# Patient Record
Sex: FEMALE | Race: WHITE | NOT HISPANIC OR LATINO | Employment: OTHER | URBAN - METROPOLITAN AREA
[De-identification: names, ages, dates, MRNs, and addresses within clinical notes are randomized per-mention and may not be internally consistent; named-entity substitution may affect disease eponyms.]

---

## 2017-02-23 ENCOUNTER — HOSPITAL ENCOUNTER (OUTPATIENT)
Dept: SLEEP MEDICINE | Facility: HOSPITAL | Age: 32
Discharge: HOME | End: 2017-02-23
Attending: INTERNAL MEDICINE | Admitting: INTERNAL MEDICINE

## 2017-02-27 ENCOUNTER — HOSPITAL ENCOUNTER (OUTPATIENT)
Dept: SLEEP MEDICINE | Facility: HOSPITAL | Age: 32
Discharge: HOME | End: 2017-02-27
Attending: INTERNAL MEDICINE | Admitting: INTERNAL MEDICINE

## 2017-03-04 ENCOUNTER — HOSPITAL ENCOUNTER (OUTPATIENT)
Dept: RESPIRATORY THERAPY | Facility: HOSPITAL | Age: 32
Discharge: HOME | End: 2017-03-04
Attending: INTERNAL MEDICINE | Admitting: INTERNAL MEDICINE

## 2017-03-09 ENCOUNTER — HOSPITAL ENCOUNTER (OUTPATIENT)
Dept: SLEEP MEDICINE | Facility: HOSPITAL | Age: 32
Discharge: HOME | End: 2017-03-09
Attending: INTERNAL MEDICINE | Admitting: INTERNAL MEDICINE

## 2017-06-03 ENCOUNTER — HOSPITAL ENCOUNTER (OUTPATIENT)
Dept: URGENT CARE | Facility: CLINIC | Age: 32
Setting detail: SPECIMEN
Discharge: HOME | End: 2017-06-03
Attending: FAMILY MEDICINE | Admitting: FAMILY MEDICINE

## 2017-06-03 LAB
BACTERIA SPEC AEROBE CULT: NORMAL
Lab: NORMAL
MICRO REPORT STATUS: NORMAL
SPECIMEN SOURCE: NORMAL

## 2017-10-04 ENCOUNTER — HOSPITAL ENCOUNTER (OUTPATIENT)
Dept: ORTHOPEDIC SURGERY | Facility: CLINIC | Age: 32
Discharge: HOME | End: 2017-10-04
Attending: PHYSICIAN ASSISTANT | Admitting: PHYSICIAN ASSISTANT

## 2017-10-31 ENCOUNTER — HOSPITAL ENCOUNTER (OUTPATIENT)
Dept: PHYSICAL THERAPY | Facility: HOSPITAL | Age: 32
Setting detail: RECURRING SERIES
Discharge: HOME | End: 2018-01-30
Attending: PHYSICIAN ASSISTANT | Admitting: PHYSICIAN ASSISTANT

## 2017-12-23 ENCOUNTER — HOSPITAL ENCOUNTER (OUTPATIENT)
Dept: MRI IMAGING | Facility: HOSPITAL | Age: 32
Discharge: HOME | End: 2017-12-23
Attending: PHYSICIAN ASSISTANT | Admitting: PHYSICIAN ASSISTANT

## 2018-02-01 ENCOUNTER — HOSPITAL ENCOUNTER (OUTPATIENT)
Dept: PHYSICAL THERAPY | Facility: HOSPITAL | Age: 33
Setting detail: RECURRING SERIES
Discharge: HOME | End: 2018-02-08
Attending: PHYSICIAN ASSISTANT | Admitting: PHYSICIAN ASSISTANT

## 2018-02-16 ENCOUNTER — HOSPITAL ENCOUNTER (OUTPATIENT)
Dept: CT IMAGING | Facility: HOSPITAL | Age: 33
Discharge: HOME | End: 2018-02-16
Attending: PSYCHIATRY & NEUROLOGY | Admitting: PSYCHIATRY & NEUROLOGY

## 2018-03-29 ENCOUNTER — HOSPITAL ENCOUNTER (OUTPATIENT)
Dept: PAIN MEDICINE | Facility: HOSPITAL | Age: 33
Discharge: HOME | End: 2018-03-29
Attending: ANESTHESIOLOGY | Admitting: ANESTHESIOLOGY

## 2018-04-18 ENCOUNTER — HOSPITAL ENCOUNTER (OUTPATIENT)
Dept: PAIN MEDICINE | Facility: HOSPITAL | Age: 33
Discharge: HOME | End: 2018-04-18
Attending: ANESTHESIOLOGY | Admitting: ANESTHESIOLOGY

## 2018-04-25 ENCOUNTER — HOSPITAL ENCOUNTER (OUTPATIENT)
Dept: PAIN MEDICINE | Facility: HOSPITAL | Age: 33
Discharge: HOME | End: 2018-04-25
Attending: ANESTHESIOLOGY | Admitting: ANESTHESIOLOGY

## 2018-10-22 ENCOUNTER — HOSPITAL ENCOUNTER (OUTPATIENT)
Dept: MAMMOGRAPHY | Facility: HOSPITAL | Age: 33
Discharge: HOME | End: 2018-10-22
Attending: NURSE PRACTITIONER | Admitting: NURSE PRACTITIONER

## 2018-12-10 ENCOUNTER — HOSPITAL ENCOUNTER (OUTPATIENT)
Dept: PAIN MEDICINE | Facility: HOSPITAL | Age: 33
Discharge: HOME | End: 2018-12-10
Attending: ANESTHESIOLOGY | Admitting: ANESTHESIOLOGY

## 2019-01-17 ENCOUNTER — HOSPITAL ENCOUNTER (OUTPATIENT)
Dept: MRI IMAGING | Facility: HOSPITAL | Age: 34
Discharge: HOME | End: 2019-01-17
Attending: PHYSICIAN ASSISTANT | Admitting: PHYSICIAN ASSISTANT

## 2019-02-01 ENCOUNTER — HOSPITAL ENCOUNTER (OUTPATIENT)
Dept: PAIN MEDICINE | Facility: HOSPITAL | Age: 34
Discharge: HOME | End: 2019-02-01
Attending: ANESTHESIOLOGY | Admitting: ANESTHESIOLOGY

## 2019-02-22 ENCOUNTER — HOSPITAL ENCOUNTER (OUTPATIENT)
Dept: PAIN MEDICINE | Facility: HOSPITAL | Age: 34
Discharge: HOME | End: 2019-02-22
Attending: ANESTHESIOLOGY | Admitting: ANESTHESIOLOGY

## 2019-02-26 ENCOUNTER — HOSPITAL ENCOUNTER (OUTPATIENT)
Dept: GENERAL RADIOLOGY | Facility: HOSPITAL | Age: 34
Discharge: HOME | End: 2019-02-26
Attending: PHYSICIAN ASSISTANT | Admitting: PHYSICIAN ASSISTANT

## 2019-05-17 ENCOUNTER — HOSPITAL ENCOUNTER (OUTPATIENT)
Dept: MAMMOGRAPHY | Facility: HOSPITAL | Age: 34
Discharge: HOME | End: 2019-05-17
Attending: NURSE PRACTITIONER | Admitting: NURSE PRACTITIONER

## 2019-06-25 ENCOUNTER — OFFICE VISIT (OUTPATIENT)
Dept: ORTHOPEDIC SURGERY | Facility: CLINIC | Age: 34
End: 2019-06-25

## 2019-06-25 VITALS
SYSTOLIC BLOOD PRESSURE: 139 MMHG | BODY MASS INDEX: 22.2 KG/M2 | DIASTOLIC BLOOD PRESSURE: 78 MMHG | WEIGHT: 130 LBS | HEART RATE: 96 BPM | HEIGHT: 64 IN

## 2019-06-25 DIAGNOSIS — M50.10 CERVICAL RADICULOPATHY DUE TO INTERVERTEBRAL DISC DISORDER: Primary | ICD-10-CM

## 2019-06-25 PROCEDURE — 99213 OFFICE O/P EST LOW 20 MIN: CPT | Performed by: PHYSICIAN ASSISTANT

## 2019-06-25 RX ORDER — SUMATRIPTAN 50 MG/1
TABLET, FILM COATED ORAL
COMMUNITY
Start: 2018-03-29 | End: 2020-03-10 | Stop reason: ALTCHOICE

## 2019-06-25 RX ORDER — BUSPIRONE HYDROCHLORIDE 30 MG/1
TABLET ORAL EVERY 12 HOURS
COMMUNITY
Start: 2017-10-04 | End: 2019-12-10

## 2019-06-25 RX ORDER — LAMOTRIGINE 100 MG/1
200 TABLET ORAL EVERY 24 HOURS
COMMUNITY
Start: 2016-03-09 | End: 2020-03-10 | Stop reason: ALTCHOICE

## 2019-06-25 RX ORDER — AMLODIPINE BESYLATE 10 MG/1
TABLET ORAL
COMMUNITY
Start: 2016-03-09 | End: 2022-12-01

## 2019-06-25 RX ORDER — IBUPROFEN 800 MG/1
TABLET ORAL
COMMUNITY
Start: 2015-12-22 | End: 2020-02-25 | Stop reason: HOSPADM

## 2019-06-25 RX ORDER — DOCUSATE SODIUM 100 MG/1
1 CAPSULE, LIQUID FILLED ORAL EVERY 12 HOURS
COMMUNITY
Start: 2017-08-24

## 2019-06-25 RX ORDER — BUPRENORPHINE HYDROCHLORIDE AND NALOXONE HYDROCHLORIDE DIHYDRATE 8; 2 MG/1; MG/1
TABLET SUBLINGUAL
Refills: 0 | COMMUNITY
Start: 2019-06-21

## 2019-06-25 RX ORDER — MIRTAZAPINE 15 MG/1
15 TABLET, FILM COATED ORAL DAILY
COMMUNITY
End: 2019-12-10

## 2019-06-25 RX ORDER — LURASIDONE HYDROCHLORIDE 20 MG/1
20 TABLET, FILM COATED ORAL DAILY
COMMUNITY
End: 2019-12-10

## 2019-06-25 RX ORDER — DULOXETIN HYDROCHLORIDE 60 MG/1
CAPSULE, DELAYED RELEASE ORAL
COMMUNITY
Start: 2017-10-04

## 2019-06-25 RX ORDER — METHYLPHENIDATE HYDROCHLORIDE 36 MG/1
TABLET ORAL
Refills: 0 | COMMUNITY
Start: 2019-05-24 | End: 2019-12-10

## 2019-06-25 RX ORDER — RAMELTEON 8 MG/1
TABLET ORAL
COMMUNITY
Start: 2017-11-30 | End: 2022-12-01

## 2019-06-25 RX ORDER — AMLODIPINE BESYLATE AND BENAZEPRIL HYDROCHLORIDE 5; 10 MG/1; MG/1
1 CAPSULE ORAL DAILY
COMMUNITY
End: 2019-12-10

## 2019-06-25 RX ORDER — AMITRIPTYLINE HYDROCHLORIDE 10 MG/1
TABLET, FILM COATED ORAL EVERY 24 HOURS
COMMUNITY
Start: 2017-01-26 | End: 2022-12-01

## 2019-06-25 RX ORDER — DOXAZOSIN MESYLATE 4 MG/1
TABLET ORAL EVERY 24 HOURS
COMMUNITY
Start: 2018-03-29 | End: 2022-12-01

## 2019-06-27 NOTE — PROGRESS NOTES
Orthopedic Spine Follow Up    Referring Provider: No ref. provider found  Primary Care Provider: William Rao MD    Patient Name: Lynda Nolen  Patient Age: 33 y.o.  Chief Complaint: had concerns including Pain of the Lumbar Spine; Pain of the Cervical Spine; and Follow-up (injection x1 ins will not pay for anymore).    History of Present Illness: Lynda Nolen is a 33 y.o. year old female here in  Follow up today with chief complaint of had concerns including Pain of the Lumbar Spine; Pain of the Cervical Spine; and Follow-up (injection x1 ins will not pay for anymore)..Patient complains of significant neck pain, shooting pains in the arm(s), numbness of the arm(s) and loss of strength of the arm(s) for approximately 5 years.  Pain is described as constant, severe.  Pain is aggravated by extending head backwards, twisting to left side and twisting to right side, alleviated by inactivity, Cymbalta, amitriptyline. she has had previous herniated cervical disc.  Previous treatment includes cervical epidural injections which did not offer too much relief.  Patient denies  gait disturbance, loss of dexterity in her hands, weakness in hands, dropping objects, change in bowel and bowel bladder.  Associated with headache.      Imaging:  We have an MRI of cervical spine my interpretation is two-level disc degeneration significantly at the level of C5-6 with moderate central and foraminal narrowing in this area.    Assessment:   1. Cervical radiculopathy due to intervertebral disc disorder        Plan:  Patient has undergone multiple conservative treatments including cervical epidural injections without any relief.  She also takes Cymbalta and amitriptyline which only takes the edge off her pain.  She would like to proceed with surgical intervention if indicated and I have recommended being evaluated for cervical disc arthroplasty given her relative youth.  We need to evaluate her facet joints more  thoroughly, and get better quality imaging of her canal and neural foramen.  Plan will be to send her for a CT myelogram of cervical spine and have her follow-up afterwards to make a final decision regarding surgery.    Subjective     Review of Systems   Constitutional: Positive for activity change. Negative for fatigue and fever.   HENT: Negative for sore throat.    Eyes: Negative for double vision.   Respiratory: Negative for choking.    Gastrointestinal: Negative for abdominal pain and constipation.   Musculoskeletal: Positive for neck pain and neck stiffness. Negative for back pain and gait problem.   Neurological: Positive for headache. Negative for numbness.   Hematological: Does not bruise/bleed easily.   Psychiatric/Behavioral: Positive for sleep disturbance.       The following portions of the patient's history were reviewed and updated as appropriate: current medications, past medical history, past surgical history and problem list.    Objective     Physical Exam   Constitutional: She is oriented to person, place, and time. She appears well-developed.   HENT:   Head: Normocephalic.   Eyes: Conjunctivae are normal.   Neck: Spinous process tenderness and muscular tenderness present. Neck rigidity present. Decreased range of motion present.   Pulmonary/Chest: Effort normal.   Musculoskeletal: She exhibits tenderness.        Right hip: Normal.        Left hip: Normal.        Lumbar back: She exhibits decreased range of motion, tenderness and pain.   Neurological: She is oriented to person, place, and time. She has normal reflexes. She displays no atrophy. No cranial nerve deficit or sensory deficit.   Skin: Skin is warm.   Vitals reviewed.    Ortho Exam      1. Cervical radiculopathy due to intervertebral disc disorder         Orders Placed This Encounter   Procedures   • IR Myelogram Cervical Spine     Order Specific Question:   Reason for Exam:     Answer:   preoperative planning     Order Specific Question:    Patient Pregnant     Answer:   Unknown   • CT Cervical Spine With Intrathecal Contrast     Order Specific Question:   Patient Pregnant     Answer:   Unknown       Procedures

## 2019-07-03 ENCOUNTER — HOSPITAL ENCOUNTER (OUTPATIENT)
Dept: GENERAL RADIOLOGY | Facility: HOSPITAL | Age: 34
Discharge: HOME | End: 2019-07-03

## 2019-07-03 ENCOUNTER — APPOINTMENT (OUTPATIENT)
Dept: CT IMAGING | Facility: HOSPITAL | Age: 34
End: 2019-07-03

## 2019-08-02 ENCOUNTER — TELEPHONE (OUTPATIENT)
Dept: ORTHOPEDIC SURGERY | Facility: CLINIC | Age: 34
End: 2019-08-02

## 2019-08-07 DIAGNOSIS — M50.10 CERVICAL RADICULOPATHY DUE TO INTERVERTEBRAL DISC DISORDER: Primary | ICD-10-CM

## 2019-09-19 ENCOUNTER — TELEPHONE (OUTPATIENT)
Dept: ORTHOPEDIC SURGERY | Facility: CLINIC | Age: 34
End: 2019-09-19

## 2019-10-01 DIAGNOSIS — M50.10 CERVICAL RADICULOPATHY DUE TO INTERVERTEBRAL DISC DISORDER: Primary | ICD-10-CM

## 2019-10-02 RX ORDER — GABAPENTIN 800 MG/1
800 TABLET ORAL 4 TIMES DAILY
COMMUNITY

## 2019-10-08 RX ORDER — SODIUM CHLORIDE 0.9 % (FLUSH) 0.9 %
3-10 SYRINGE (ML) INJECTION AS NEEDED
Status: DISCONTINUED | OUTPATIENT
Start: 2019-10-08 | End: 2019-10-12 | Stop reason: HOSPADM

## 2019-10-08 RX ORDER — SODIUM CHLORIDE 0.9 % (FLUSH) 0.9 %
3 SYRINGE (ML) INJECTION EVERY 12 HOURS SCHEDULED
Status: DISCONTINUED | OUTPATIENT
Start: 2019-10-08 | End: 2019-10-12 | Stop reason: HOSPADM

## 2019-10-09 ENCOUNTER — HOSPITAL ENCOUNTER (OUTPATIENT)
Dept: GENERAL RADIOLOGY | Facility: HOSPITAL | Age: 34
Discharge: HOME | End: 2019-10-09

## 2019-10-09 ENCOUNTER — APPOINTMENT (OUTPATIENT)
Dept: CT IMAGING | Facility: HOSPITAL | Age: 34
End: 2019-10-09

## 2019-11-15 ENCOUNTER — TELEPHONE (OUTPATIENT)
Dept: ORTHOPEDIC SURGERY | Facility: CLINIC | Age: 34
End: 2019-11-15

## 2019-11-15 NOTE — TELEPHONE ENCOUNTER
Finally got the auth for CT Myelogram damian, I called [employer] in IR and left her a vm that it was approved and the order and the auth will be in pts chart and I asked that she call her and get this set up.    I also called the pt and left her a vm stating that it was approved and that she should be getting a call soon and if not she was to call me Monday to let me know

## 2019-11-26 RX ORDER — SODIUM CHLORIDE 0.9 % (FLUSH) 0.9 %
3 SYRINGE (ML) INJECTION EVERY 12 HOURS SCHEDULED
Status: DISCONTINUED | OUTPATIENT
Start: 2019-11-26 | End: 2019-11-27 | Stop reason: HOSPADM

## 2019-11-27 ENCOUNTER — HOSPITAL ENCOUNTER (OUTPATIENT)
Dept: CT IMAGING | Facility: HOSPITAL | Age: 34
Discharge: HOME | End: 2019-11-27

## 2019-11-27 ENCOUNTER — HOSPITAL ENCOUNTER (OUTPATIENT)
Dept: GENERAL RADIOLOGY | Facility: HOSPITAL | Age: 34
Discharge: HOME | End: 2019-11-27
Admitting: RADIOLOGY

## 2019-11-27 VITALS
HEART RATE: 74 BPM | HEIGHT: 62 IN | BODY MASS INDEX: 24.42 KG/M2 | OXYGEN SATURATION: 98 % | RESPIRATION RATE: 14 BRPM | DIASTOLIC BLOOD PRESSURE: 75 MMHG | WEIGHT: 132.72 LBS | SYSTOLIC BLOOD PRESSURE: 121 MMHG | TEMPERATURE: 99.1 F

## 2019-11-27 DIAGNOSIS — M50.10 CERVICAL RADICULOPATHY DUE TO INTERVERTEBRAL DISC DISORDER: ICD-10-CM

## 2019-11-27 LAB
APTT PPP: 27.3 SECONDS (ref 24–31)
B-HCG UR QL: NEGATIVE
BASOPHILS # BLD AUTO: 0 10*3/MM3 (ref 0–0.2)
BASOPHILS NFR BLD AUTO: 0.6 % (ref 0–1.5)
DEPRECATED RDW RBC AUTO: 41.6 FL (ref 37–54)
EOSINOPHIL # BLD AUTO: 0.1 10*3/MM3 (ref 0–0.4)
EOSINOPHIL NFR BLD AUTO: 1.9 % (ref 0.3–6.2)
ERYTHROCYTE [DISTWIDTH] IN BLOOD BY AUTOMATED COUNT: 12.9 % (ref 12.3–15.4)
HCT VFR BLD AUTO: 43.9 % (ref 34–46.6)
HGB BLD-MCNC: 15.1 G/DL (ref 12–15.9)
INR PPP: 1.01 (ref 0.9–1.1)
LYMPHOCYTES # BLD AUTO: 2.7 10*3/MM3 (ref 0.7–3.1)
LYMPHOCYTES NFR BLD AUTO: 34.2 % (ref 19.6–45.3)
MCH RBC QN AUTO: 31.5 PG (ref 26.6–33)
MCHC RBC AUTO-ENTMCNC: 34.3 G/DL (ref 31.5–35.7)
MCV RBC AUTO: 91.8 FL (ref 79–97)
MONOCYTES # BLD AUTO: 0.7 10*3/MM3 (ref 0.1–0.9)
MONOCYTES NFR BLD AUTO: 9.2 % (ref 5–12)
NEUTROPHILS # BLD AUTO: 4.3 10*3/MM3 (ref 1.7–7)
NEUTROPHILS NFR BLD AUTO: 54.1 % (ref 42.7–76)
NRBC BLD AUTO-RTO: 0.1 /100 WBC (ref 0–0.2)
PLATELET # BLD AUTO: 276 10*3/MM3 (ref 140–450)
PMV BLD AUTO: 8.5 FL (ref 6–12)
PROTHROMBIN TIME: 10.6 SECONDS (ref 9.6–11.7)
RBC # BLD AUTO: 4.78 10*6/MM3 (ref 3.77–5.28)
WBC NRBC COR # BLD: 7.9 10*3/MM3 (ref 3.4–10.8)

## 2019-11-27 PROCEDURE — 85730 THROMBOPLASTIN TIME PARTIAL: CPT | Performed by: RADIOLOGY

## 2019-11-27 PROCEDURE — 72126 CT NECK SPINE W/DYE: CPT

## 2019-11-27 PROCEDURE — 62302 MYELOGRAPHY LUMBAR INJECTION: CPT

## 2019-11-27 PROCEDURE — 85025 COMPLETE CBC W/AUTO DIFF WBC: CPT | Performed by: RADIOLOGY

## 2019-11-27 PROCEDURE — 25010000002 IOPAMIDOL 61 % SOLUTION: Performed by: PHYSICIAN ASSISTANT

## 2019-11-27 PROCEDURE — 81025 URINE PREGNANCY TEST: CPT | Performed by: RADIOLOGY

## 2019-11-27 PROCEDURE — 25010000003 LIDOCAINE 1 % SOLUTION: Performed by: PHYSICIAN ASSISTANT

## 2019-11-27 PROCEDURE — 72240 MYELOGRAPHY NECK SPINE: CPT

## 2019-11-27 PROCEDURE — 85610 PROTHROMBIN TIME: CPT | Performed by: RADIOLOGY

## 2019-11-27 RX ORDER — LIDOCAINE HYDROCHLORIDE 10 MG/ML
20 INJECTION, SOLUTION INFILTRATION; PERINEURAL
Status: COMPLETED | OUTPATIENT
Start: 2019-11-27 | End: 2019-11-27

## 2019-11-27 RX ADMIN — LIDOCAINE HYDROCHLORIDE 20 ML: 10 INJECTION, SOLUTION INFILTRATION; PERINEURAL at 13:00

## 2019-11-27 RX ADMIN — IOPAMIDOL 15 ML: 612 INJECTION, SOLUTION INTRATHECAL at 13:00

## 2019-11-27 NOTE — POST-PROCEDURE NOTE
IR POST OP NOTE    Procedure:C myelogram      Pre Op DX:Neck pain      Post Op DX:same      Anesthesia: Local      Findings:See dictation      Complications:No immediate.       Provider Signature: Dr. Richy Peterson

## 2019-11-27 NOTE — DISCHARGE INSTRUCTIONS
A responsible adult should stay with you and you should rest quietly for the rest of the day. Do not drink alcohol, drive or cook for 24 hours following your procedure.  Progress your diet as tolerated. Increase your fluid intake over the next 24 hours. Resume your usually medications including aspirin.  When you remove your dressing in 24 hours, a small amount of blood is to be expected. Do not be alarmed.  If you feel it is bleeding excessively apply pressure and proceed to the Emergency room.  Do not shower, bath, or get your dressing wet at all for 24 hours.  You may shower after the dressing is removed. Keep your head elevated at least 30 degrees for the net 24 hours.  No lifting more that 10 pounds for 24 hours.  If severe pain, increased shortness of air or racing heartbeat occur, seek immediate medical attention.  Follow up with Ted Mederos for results.

## 2019-12-10 ENCOUNTER — OFFICE VISIT (OUTPATIENT)
Dept: ORTHOPEDIC SURGERY | Facility: CLINIC | Age: 34
End: 2019-12-10

## 2019-12-10 VITALS
DIASTOLIC BLOOD PRESSURE: 86 MMHG | BODY MASS INDEX: 24.48 KG/M2 | HEIGHT: 62 IN | WEIGHT: 133 LBS | HEART RATE: 80 BPM | SYSTOLIC BLOOD PRESSURE: 136 MMHG

## 2019-12-10 DIAGNOSIS — M50.10 CERVICAL RADICULOPATHY DUE TO INTERVERTEBRAL DISC DISORDER: Primary | ICD-10-CM

## 2019-12-10 PROCEDURE — 99213 OFFICE O/P EST LOW 20 MIN: CPT | Performed by: PHYSICIAN ASSISTANT

## 2019-12-10 RX ORDER — METHYLPHENIDATE HYDROCHLORIDE 36 MG/1
TABLET, EXTENDED RELEASE ORAL
Refills: 0 | COMMUNITY
Start: 2019-10-11 | End: 2020-02-25 | Stop reason: HOSPADM

## 2019-12-10 NOTE — PROGRESS NOTES
Orthopedic Spine Follow Up    Referring Provider: No ref. provider found  Primary Care Provider: William Rao MD    Patient Name: Lynda Nolen  Patient Age: 34 y.o.  Chief Complaint: had concerns including Follow-up of the Cervical Spine (CT/Myelogram Cspine).    History of Present Illness: Lynda Nolen is a 34 y.o. year old female here in  Follow up today with chief complaint of had concerns including Follow-up of the Cervical Spine (CT/Myelogram Cspine)..Patient complains of significant neck pain, shooting pains in the arm(s), numbness of the arm(s) and loss of strength of the arm(s) for approximately 5 years.  Pain is described as constant, severe.  Pain is aggravated by extending head backwards, twisting to left side and twisting to right side, alleviated by inactivity, Cymbalta, amitriptyline. she has had previous herniated cervical disc.  Previous treatment includes cervical epidural injections which did not offer too much relief.  Patient denies  gait disturbance, loss of dexterity in her hands, weakness in hands, dropping objects, change in bowel and bowel bladder.  Associated with headache.  His headaches have gotten really bad.  She is sleeping a little bit better.  Her low back also continues to bother her.  She is status post 4-5 fusion.  She had a CT myelogram of lumbar spine which showed solid fusion with no significant abnormality of lumbar spine.  She has had an MRI of cervical spine which demonstrates cervical disc degeneration primarily at C5-6.  She states the problem is getting so bad she cannot do her everyday activities without severe discomfort.  She like to have this addressed surgically as all other treatment options have failed.  She was sent for a CT myelogram of cervical spine here today to review that.      Imaging:  My interpretation of cervical myelogram is C5-C7 disc degeneration with mild stenosis centrally and left foraminal narrowing at both levels worse at  C5-6.    Assessment:   1. Cervical radiculopathy due to intervertebral disc disorder        Plan:  Patient would like to have this addressed surgically.  I recommended a cervical artificial disc.  We will discuss the case with Dr. Singh to make a final decision.    Subjective     Review of Systems   Constitutional: Positive for activity change. Negative for fatigue and fever.   HENT: Negative for sore throat.    Eyes: Negative for double vision.   Respiratory: Negative for choking.    Gastrointestinal: Negative for abdominal pain and constipation.   Musculoskeletal: Positive for neck pain and neck stiffness. Negative for back pain and gait problem.   Neurological: Positive for headache. Negative for numbness.   Hematological: Does not bruise/bleed easily.   Psychiatric/Behavioral: Positive for sleep disturbance.       The following portions of the patient's history were reviewed and updated as appropriate: current medications, past medical history, past surgical history and problem list.    Objective     Physical Exam   Constitutional: She is oriented to person, place, and time. She appears well-developed.   HENT:   Head: Normocephalic.   Eyes: Conjunctivae are normal.   Neck: Spinous process tenderness and muscular tenderness present. Neck rigidity present. Decreased range of motion present.   Pulmonary/Chest: Effort normal.   Musculoskeletal: She exhibits tenderness.        Right hip: Normal.        Left hip: Normal.        Lumbar back: She exhibits decreased range of motion, tenderness and pain.   Neurological: She is oriented to person, place, and time. She has normal reflexes. She displays no atrophy. No cranial nerve deficit or sensory deficit.   Skin: Skin is warm.   Vitals reviewed.    Ortho Exam      1. Cervical radiculopathy due to intervertebral disc disorder         No orders of the defined types were placed in this encounter.      Procedures

## 2019-12-16 ENCOUNTER — APPOINTMENT (OUTPATIENT)
Dept: GENERAL RADIOLOGY | Facility: HOSPITAL | Age: 34
End: 2019-12-16

## 2019-12-16 ENCOUNTER — APPOINTMENT (OUTPATIENT)
Dept: CT IMAGING | Facility: HOSPITAL | Age: 34
End: 2019-12-16

## 2019-12-16 ENCOUNTER — HOSPITAL ENCOUNTER (EMERGENCY)
Facility: HOSPITAL | Age: 34
Discharge: HOME | End: 2019-12-16
Attending: EMERGENCY MEDICINE | Admitting: EMERGENCY MEDICINE

## 2019-12-16 VITALS
HEART RATE: 71 BPM | TEMPERATURE: 98.4 F | BODY MASS INDEX: 24.81 KG/M2 | OXYGEN SATURATION: 97 % | DIASTOLIC BLOOD PRESSURE: 75 MMHG | SYSTOLIC BLOOD PRESSURE: 132 MMHG | WEIGHT: 131.39 LBS | HEIGHT: 61 IN | RESPIRATION RATE: 14 BRPM

## 2019-12-16 DIAGNOSIS — S00.03XA CONTUSION OF SCALP, INITIAL ENCOUNTER: ICD-10-CM

## 2019-12-16 DIAGNOSIS — S13.9XXA NECK SPRAIN, INITIAL ENCOUNTER: ICD-10-CM
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
## 2019-12-16 DIAGNOSIS — S33.5XXA LUMBAR SPRAIN, INITIAL ENCOUNTER: ICD-10-CM

## 2019-12-16 PROCEDURE — 99283 EMERGENCY DEPT VISIT LOW MDM: CPT

## 2019-12-16 PROCEDURE — 72125 CT NECK SPINE W/O DYE: CPT

## 2019-12-16 PROCEDURE — 70450 CT HEAD/BRAIN W/O DYE: CPT

## 2019-12-16 PROCEDURE — 72110 X-RAY EXAM L-2 SPINE 4/>VWS: CPT
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
## 2019-12-16 NOTE — DISCHARGE INSTRUCTIONS
Follow-up primary care doctor 1 week.  Neurochecks every 4 hours return for vomiting mental status changes unequal pupils unable to answer questions appropriately or any other new or worse problems or concerns

## 2020-01-02 ENCOUNTER — TELEPHONE (OUTPATIENT)
Dept: NEUROSURGERY | Facility: CLINIC | Age: 35
End: 2020-01-02
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
## 2020-01-27 ENCOUNTER — OFFICE VISIT (OUTPATIENT)
Dept: NEUROSURGERY | Facility: CLINIC | Age: 35
End: 2020-01-27

## 2020-01-27 VITALS
WEIGHT: 136 LBS | SYSTOLIC BLOOD PRESSURE: 133 MMHG | HEART RATE: 99 BPM | BODY MASS INDEX: 25.68 KG/M2 | DIASTOLIC BLOOD PRESSURE: 65 MMHG | HEIGHT: 61 IN

## 2020-01-27 DIAGNOSIS — M50.10 CERVICAL RADICULOPATHY DUE TO INTERVERTEBRAL DISC DISORDER: Primary | ICD-10-CM

## 2020-01-27 PROCEDURE — 99203 OFFICE O/P NEW LOW 30 MIN: CPT | Performed by: NEUROLOGICAL SURGERY
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
## 2020-01-28 ENCOUNTER — PREP FOR SURGERY (OUTPATIENT)
Dept: OTHER | Facility: HOSPITAL | Age: 35
End: 2020-01-28

## 2020-01-28 DIAGNOSIS — M50.10 CERVICAL RADICULOPATHY DUE TO INTERVERTEBRAL DISC DISORDER: Primary | ICD-10-CM

## 2020-01-28 RX ORDER — SODIUM CHLORIDE 0.9 % (FLUSH) 0.9 %
3-10 SYRINGE (ML) INJECTION AS NEEDED
Status: CANCELLED | OUTPATIENT
Start: 2020-01-28

## 2020-01-28 RX ORDER — CEFAZOLIN SODIUM IN 0.9 % NACL 3 G/100 ML
3 INTRAVENOUS SOLUTION, PIGGYBACK (ML) INTRAVENOUS ONCE
Status: CANCELLED | OUTPATIENT
Start: 2020-01-28 | End: 2020-01-28

## 2020-01-28 RX ORDER — SODIUM CHLORIDE 0.9 % (FLUSH) 0.9 %
3 SYRINGE (ML) INJECTION EVERY 12 HOURS SCHEDULED
Status: CANCELLED | OUTPATIENT
Start: 2020-01-28

## 2020-02-20 ENCOUNTER — HOSPITAL ENCOUNTER (OUTPATIENT)
Dept: GENERAL RADIOLOGY | Facility: HOSPITAL | Age: 35
Discharge: HOME | End: 2020-02-20
Admitting: NEUROLOGICAL SURGERY

## 2020-02-20 ENCOUNTER — APPOINTMENT (OUTPATIENT)
Dept: PREADMISSION TESTING | Facility: HOSPITAL | Age: 35
End: 2020-02-20

## 2020-02-20 VITALS
DIASTOLIC BLOOD PRESSURE: 77 MMHG | SYSTOLIC BLOOD PRESSURE: 144 MMHG | WEIGHT: 135 LBS | HEART RATE: 93 BPM | HEIGHT: 62 IN | BODY MASS INDEX: 24.84 KG/M2 | OXYGEN SATURATION: 96 %

## 2020-02-20 DIAGNOSIS — M50.10 CERVICAL RADICULOPATHY DUE TO INTERVERTEBRAL DISC DISORDER: ICD-10-CM

## 2020-02-20 LAB
ABO GROUP BLD: NORMAL
ALBUMIN SERPL-MCNC: 4.8 G/DL (ref 3.5–5.2)
ALBUMIN/GLOB SERPL: 2.1 G/DL
ALP SERPL-CCNC: 64 U/L (ref 39–117)
ALT SERPL W P-5'-P-CCNC: 16 U/L (ref 1–33)
ANION GAP SERPL CALCULATED.3IONS-SCNC: 10 MMOL/L (ref 5–15)
APTT PPP: 26 SECONDS (ref 24–31)
AST SERPL-CCNC: 20 U/L (ref 1–32)
BASOPHILS # BLD AUTO: 0.1 10*3/MM3 (ref 0–0.2)
BASOPHILS NFR BLD AUTO: 0.5 % (ref 0–1.5)
BILIRUB SERPL-MCNC: 0.3 MG/DL (ref 0.2–1.2)
BILIRUB UR QL STRIP: NEGATIVE
BLD GP AB SCN SERPL QL: NEGATIVE
BUN BLD-MCNC: 7 MG/DL (ref 6–20)
BUN/CREAT SERPL: 10 (ref 7–25)
CALCIUM SPEC-SCNC: 10.3 MG/DL (ref 8.6–10.5)
CHLORIDE SERPL-SCNC: 100 MMOL/L (ref 98–107)
CLARITY UR: ABNORMAL
CO2 SERPL-SCNC: 31 MMOL/L (ref 22–29)
COLOR UR: YELLOW
CREAT BLD-MCNC: 0.7 MG/DL (ref 0.57–1)
DEPRECATED RDW RBC AUTO: 42.9 FL (ref 37–54)
EOSINOPHIL # BLD AUTO: 0.1 10*3/MM3 (ref 0–0.4)
EOSINOPHIL NFR BLD AUTO: 0.7 % (ref 0.3–6.2)
ERYTHROCYTE [DISTWIDTH] IN BLOOD BY AUTOMATED COUNT: 13.4 % (ref 12.3–15.4)
GFR SERPL CREATININE-BSD FRML MDRD: 95 ML/MIN/1.73
GLOBULIN UR ELPH-MCNC: 2.3 GM/DL
GLUCOSE BLD-MCNC: 111 MG/DL (ref 65–99)
GLUCOSE UR STRIP-MCNC: NEGATIVE MG/DL
HCT VFR BLD AUTO: 43.4 % (ref 34–46.6)
HGB BLD-MCNC: 14.8 G/DL (ref 12–15.9)
HGB UR QL STRIP.AUTO: NEGATIVE
INR PPP: 1.08 (ref 0.9–1.1)
KETONES UR QL STRIP: NEGATIVE
LEUKOCYTE ESTERASE UR QL STRIP.AUTO: NEGATIVE
LYMPHOCYTES # BLD AUTO: 1.3 10*3/MM3 (ref 0.7–3.1)
LYMPHOCYTES NFR BLD AUTO: 9.9 % (ref 19.6–45.3)
MCH RBC QN AUTO: 30.9 PG (ref 26.6–33)
MCHC RBC AUTO-ENTMCNC: 34.1 G/DL (ref 31.5–35.7)
MCV RBC AUTO: 90.4 FL (ref 79–97)
MONOCYTES # BLD AUTO: 0.6 10*3/MM3 (ref 0.1–0.9)
MONOCYTES NFR BLD AUTO: 4.9 % (ref 5–12)
MRSA DNA SPEC QL NAA+PROBE: NORMAL
NEUTROPHILS # BLD AUTO: 10.6 10*3/MM3 (ref 1.7–7)
NEUTROPHILS NFR BLD AUTO: 84 % (ref 42.7–76)
NITRITE UR QL STRIP: NEGATIVE
NRBC BLD AUTO-RTO: 0 /100 WBC (ref 0–0.2)
PH UR STRIP.AUTO: 7 [PH] (ref 5–8)
PLATELET # BLD AUTO: 259 10*3/MM3 (ref 140–450)
PMV BLD AUTO: 8 FL (ref 6–12)
POTASSIUM BLD-SCNC: 4 MMOL/L (ref 3.5–5.2)
PROT SERPL-MCNC: 7.1 G/DL (ref 6–8.5)
PROT UR QL STRIP: NEGATIVE
PROTHROMBIN TIME: 11.2 SECONDS (ref 9.6–11.7)
RBC # BLD AUTO: 4.8 10*6/MM3 (ref 3.77–5.28)
RH BLD: POSITIVE
SODIUM BLD-SCNC: 141 MMOL/L (ref 136–145)
SP GR UR STRIP: 1.01 (ref 1–1.03)
T&S EXPIRATION DATE: NORMAL
UROBILINOGEN UR QL STRIP: ABNORMAL
WBC NRBC COR # BLD: 12.7 10*3/MM3 (ref 3.4–10.8)

## 2020-02-20 PROCEDURE — 85730 THROMBOPLASTIN TIME PARTIAL: CPT | Performed by: NEUROLOGICAL SURGERY

## 2020-02-20 PROCEDURE — 71046 X-RAY EXAM CHEST 2 VIEWS: CPT

## 2020-02-20 PROCEDURE — 86900 BLOOD TYPING SEROLOGIC ABO: CPT

## 2020-02-20 PROCEDURE — 80053 COMPREHEN METABOLIC PANEL: CPT | Performed by: NEUROLOGICAL SURGERY

## 2020-02-20 PROCEDURE — 36415 COLL VENOUS BLD VENIPUNCTURE: CPT

## 2020-02-20 PROCEDURE — 86901 BLOOD TYPING SEROLOGIC RH(D): CPT

## 2020-02-20 PROCEDURE — 86900 BLOOD TYPING SEROLOGIC ABO: CPT | Performed by: NEUROLOGICAL SURGERY

## 2020-02-20 PROCEDURE — 86850 RBC ANTIBODY SCREEN: CPT | Performed by: NEUROLOGICAL SURGERY

## 2020-02-20 PROCEDURE — 86901 BLOOD TYPING SEROLOGIC RH(D): CPT | Performed by: NEUROLOGICAL SURGERY

## 2020-02-20 PROCEDURE — 85025 COMPLETE CBC W/AUTO DIFF WBC: CPT | Performed by: NEUROLOGICAL SURGERY

## 2020-02-20 PROCEDURE — 85610 PROTHROMBIN TIME: CPT | Performed by: NEUROLOGICAL SURGERY

## 2020-02-20 PROCEDURE — 81003 URINALYSIS AUTO W/O SCOPE: CPT | Performed by: NEUROLOGICAL SURGERY

## 2020-02-20 PROCEDURE — 87641 MR-STAPH DNA AMP PROBE: CPT | Performed by: NEUROLOGICAL SURGERY

## 2020-02-20 PROCEDURE — 93005 ELECTROCARDIOGRAM TRACING: CPT

## 2020-02-20 RX ORDER — METHOCARBAMOL 750 MG/1
750 TABLET, FILM COATED ORAL 2 TIMES DAILY
COMMUNITY
End: 2020-02-25 | Stop reason: HOSPADM

## 2020-02-20 RX ORDER — FAMOTIDINE 40 MG/1
40 TABLET, FILM COATED ORAL DAILY
COMMUNITY
End: 2022-12-01

## 2020-02-21 PROCEDURE — 93010 ELECTROCARDIOGRAM REPORT: CPT | Performed by: INTERNAL MEDICINE

## 2020-02-24 ENCOUNTER — ANESTHESIA EVENT (OUTPATIENT)
Dept: PERIOP | Facility: HOSPITAL | Age: 35
End: 2020-02-24

## 2020-02-25 ENCOUNTER — APPOINTMENT (OUTPATIENT)
Dept: GENERAL RADIOLOGY | Facility: HOSPITAL | Age: 35
End: 2020-02-25

## 2020-02-25 ENCOUNTER — HOSPITAL ENCOUNTER (OUTPATIENT)
Facility: HOSPITAL | Age: 35
Setting detail: HOSPITAL OUTPATIENT SURGERY
Discharge: HOME | End: 2020-02-25
Attending: NEUROLOGICAL SURGERY | Admitting: NEUROLOGICAL SURGERY

## 2020-02-25 ENCOUNTER — ANESTHESIA (OUTPATIENT)
Dept: PERIOP | Facility: HOSPITAL | Age: 35
End: 2020-02-25

## 2020-02-25 VITALS
RESPIRATION RATE: 20 BRPM | WEIGHT: 135.8 LBS | SYSTOLIC BLOOD PRESSURE: 121 MMHG | TEMPERATURE: 99.2 F | DIASTOLIC BLOOD PRESSURE: 72 MMHG | HEIGHT: 61 IN | HEART RATE: 71 BPM | OXYGEN SATURATION: 95 % | BODY MASS INDEX: 25.64 KG/M2

## 2020-02-25 DIAGNOSIS — M50.10 CERVICAL RADICULOPATHY DUE TO INTERVERTEBRAL DISC DISORDER: ICD-10-CM

## 2020-02-25 PROBLEM — M53.3 SACROILIAC JOINT PAIN: Status: ACTIVE | Noted: 2019-01-30

## 2020-02-25 PROBLEM — K21.9 GASTROESOPHAGEAL REFLUX DISEASE: Status: ACTIVE | Noted: 2017-11-30

## 2020-02-25 PROBLEM — J06.9 UPPER RESPIRATORY TRACT INFECTION: Status: ACTIVE | Noted: 2017-06-03

## 2020-02-25 PROBLEM — G47.33 OBSTRUCTIVE SLEEP APNEA SYNDROME IN ADULT: Status: ACTIVE | Noted: 2017-11-30

## 2020-02-25 PROBLEM — M79.7 FIBROMYOSITIS: Status: ACTIVE | Noted: 2018-03-29

## 2020-02-25 PROBLEM — G43.909 MIGRAINES: Status: ACTIVE | Noted: 2017-10-04

## 2020-02-25 PROBLEM — M54.50 LOW BACK PAIN: Status: ACTIVE | Noted: 2017-10-04

## 2020-02-25 PROBLEM — R51.9 HEADACHE: Status: ACTIVE | Noted: 2017-11-30

## 2020-02-25 PROBLEM — J02.9 ACUTE PHARYNGITIS: Status: ACTIVE | Noted: 2017-06-03

## 2020-02-25 PROBLEM — M50.321 OTHER CERVICAL DISC DEGENERATION AT C4-C5 LEVEL: Status: ACTIVE | Noted: 2017-10-04

## 2020-02-25 PROBLEM — N83.209 OVARIAN CYST: Status: ACTIVE | Noted: 2020-02-25

## 2020-02-25 PROBLEM — F19.11 HISTORY OF SUBSTANCE ABUSE (HCC): Status: ACTIVE | Noted: 2017-10-04

## 2020-02-25 PROBLEM — M47.812 OSTEOARTHRITIS OF CERVICAL SPINE WITHOUT MYELOPATHY: Status: ACTIVE | Noted: 2018-03-29

## 2020-02-25 PROBLEM — G89.29 CHRONIC PAIN: Status: ACTIVE | Noted: 2018-03-29

## 2020-02-25 PROBLEM — Z83.3 FAMILY HISTORY OF DIABETES MELLITUS: Status: ACTIVE | Noted: 2020-02-25

## 2020-02-25 LAB — B-HCG UR QL: NEGATIVE

## 2020-02-25 PROCEDURE — C1713 ANCHOR/SCREW BN/BN,TIS/BN: HCPCS | Performed by: NEUROLOGICAL SURGERY

## 2020-02-25 PROCEDURE — 81025 URINE PREGNANCY TEST: CPT | Performed by: NEUROLOGICAL SURGERY

## 2020-02-25 PROCEDURE — 25010000002 HYDROMORPHONE PER 4 MG: Performed by: NURSE ANESTHETIST, CERTIFIED REGISTERED

## 2020-02-25 PROCEDURE — 22551 ARTHRD ANT NTRBDY CERVICAL: CPT | Performed by: NEUROLOGICAL SURGERY

## 2020-02-25 PROCEDURE — 25010000002 PROPOFOL 10 MG/ML EMULSION: Performed by: NURSE ANESTHETIST, CERTIFIED REGISTERED

## 2020-02-25 PROCEDURE — 25010000002 FENTANYL CITRATE (PF) 100 MCG/2ML SOLUTION: Performed by: NURSE ANESTHETIST, CERTIFIED REGISTERED

## 2020-02-25 PROCEDURE — 72020 X-RAY EXAM OF SPINE 1 VIEW: CPT

## 2020-02-25 PROCEDURE — 25010000002 KETOROLAC TROMETHAMINE PER 15 MG: Performed by: NEUROLOGICAL SURGERY

## 2020-02-25 PROCEDURE — 25010000002 LORAZEPAM PER 2 MG: Performed by: ANESTHESIOLOGY

## 2020-02-25 PROCEDURE — 22845 INSERT SPINE FIXATION DEVICE: CPT | Performed by: NEUROLOGICAL SURGERY

## 2020-02-25 PROCEDURE — 25010000002 ONDANSETRON PER 1 MG: Performed by: NURSE ANESTHETIST, CERTIFIED REGISTERED

## 2020-02-25 PROCEDURE — 25010000002 MIDAZOLAM PER 1 MG: Performed by: NURSE ANESTHETIST, CERTIFIED REGISTERED

## 2020-02-25 PROCEDURE — 22853 INSJ BIOMECHANICAL DEVICE: CPT | Performed by: NEUROLOGICAL SURGERY

## 2020-02-25 PROCEDURE — 25010000002 DEXAMETHASONE PER 1 MG: Performed by: NURSE ANESTHETIST, CERTIFIED REGISTERED

## 2020-02-25 DEVICE — PLATE 3001019 ZEVO 19MM 1 LVL
Type: IMPLANTABLE DEVICE | Site: SPINE CERVICAL | Status: FUNCTIONAL
Brand: ZEVO™ ANTERIOR CERVICAL PLATE SYSTEM

## 2020-02-25 DEVICE — ALLOGRFT SPNG OSTEOAMP COMPRESSIBLE SM TALL 10X10X16MM: Type: IMPLANTABLE DEVICE | Site: SPINE CERVICAL | Status: FUNCTIONAL

## 2020-02-25 DEVICE — INTERBODY FUSION DEVICE NANOLOCK SURFACE TECHNOLOGY 6 DEGREE SMALL 7MM
Type: IMPLANTABLE DEVICE | Site: SPINE CERVICAL | Status: FUNCTIONAL
Brand: ENDOSKELETON TC

## 2020-02-25 RX ORDER — PROMETHAZINE HYDROCHLORIDE 25 MG/ML
6.25 INJECTION, SOLUTION INTRAMUSCULAR; INTRAVENOUS ONCE AS NEEDED
Status: DISCONTINUED | OUTPATIENT
Start: 2020-02-25 | End: 2020-02-25 | Stop reason: HOSPADM

## 2020-02-25 RX ORDER — EPHEDRINE SULFATE 50 MG/ML
5 INJECTION, SOLUTION INTRAVENOUS ONCE AS NEEDED
Status: DISCONTINUED | OUTPATIENT
Start: 2020-02-25 | End: 2020-02-25 | Stop reason: HOSPADM

## 2020-02-25 RX ORDER — PROMETHAZINE HYDROCHLORIDE 25 MG/1
25 TABLET ORAL ONCE AS NEEDED
Status: DISCONTINUED | OUTPATIENT
Start: 2020-02-25 | End: 2020-02-25 | Stop reason: HOSPADM

## 2020-02-25 RX ORDER — ACETAMINOPHEN 325 MG/1
650 TABLET ORAL ONCE AS NEEDED
Status: DISCONTINUED | OUTPATIENT
Start: 2020-02-25 | End: 2020-02-25 | Stop reason: HOSPADM

## 2020-02-25 RX ORDER — PROMETHAZINE HYDROCHLORIDE 25 MG/ML
12.5 INJECTION, SOLUTION INTRAMUSCULAR; INTRAVENOUS ONCE AS NEEDED
Status: DISCONTINUED | OUTPATIENT
Start: 2020-02-25 | End: 2020-02-25 | Stop reason: HOSPADM

## 2020-02-25 RX ORDER — LABETALOL HYDROCHLORIDE 5 MG/ML
5 INJECTION, SOLUTION INTRAVENOUS
Status: DISCONTINUED | OUTPATIENT
Start: 2020-02-25 | End: 2020-02-25 | Stop reason: HOSPADM

## 2020-02-25 RX ORDER — SODIUM CHLORIDE 0.9 % (FLUSH) 0.9 %
3 SYRINGE (ML) INJECTION EVERY 12 HOURS SCHEDULED
Status: DISCONTINUED | OUTPATIENT
Start: 2020-02-25 | End: 2020-02-25 | Stop reason: HOSPADM

## 2020-02-25 RX ORDER — ONDANSETRON 2 MG/ML
INJECTION INTRAMUSCULAR; INTRAVENOUS AS NEEDED
Status: DISCONTINUED | OUTPATIENT
Start: 2020-02-25 | End: 2020-02-25 | Stop reason: SURG

## 2020-02-25 RX ORDER — ONDANSETRON 2 MG/ML
4 INJECTION INTRAMUSCULAR; INTRAVENOUS ONCE AS NEEDED
Status: DISCONTINUED | OUTPATIENT
Start: 2020-02-25 | End: 2020-02-25 | Stop reason: HOSPADM

## 2020-02-25 RX ORDER — PROMETHAZINE HYDROCHLORIDE 25 MG/1
25 SUPPOSITORY RECTAL ONCE AS NEEDED
Status: DISCONTINUED | OUTPATIENT
Start: 2020-02-25 | End: 2020-02-25 | Stop reason: HOSPADM

## 2020-02-25 RX ORDER — PROMETHAZINE HYDROCHLORIDE 25 MG/1
25 SUPPOSITORY RECTAL ONCE AS NEEDED
Status: DISCONTINUED | OUTPATIENT
Start: 2020-02-25 | End: 2020-02-25 | Stop reason: SDUPTHER

## 2020-02-25 RX ORDER — CEFAZOLIN SODIUM IN 0.9 % NACL 3 G/100 ML
3 INTRAVENOUS SOLUTION, PIGGYBACK (ML) INTRAVENOUS ONCE
Status: DISCONTINUED | OUTPATIENT
Start: 2020-02-25 | End: 2020-02-25 | Stop reason: SDUPTHER

## 2020-02-25 RX ORDER — TRAMADOL HYDROCHLORIDE 50 MG/1
50 TABLET ORAL EVERY 6 HOURS PRN
Qty: 60 TABLET | Refills: 0 | Status: SHIPPED | OUTPATIENT
Start: 2020-02-25 | End: 2020-03-10 | Stop reason: ALTCHOICE

## 2020-02-25 RX ORDER — SODIUM CHLORIDE, SODIUM LACTATE, POTASSIUM CHLORIDE, CALCIUM CHLORIDE 600; 310; 30; 20 MG/100ML; MG/100ML; MG/100ML; MG/100ML
9 INJECTION, SOLUTION INTRAVENOUS CONTINUOUS PRN
Status: DISCONTINUED | OUTPATIENT
Start: 2020-02-25 | End: 2020-02-25 | Stop reason: HOSPADM

## 2020-02-25 RX ORDER — LIDOCAINE HYDROCHLORIDE 40 MG/ML
SOLUTION TOPICAL AS NEEDED
Status: DISCONTINUED | OUTPATIENT
Start: 2020-02-25 | End: 2020-02-25 | Stop reason: SURG

## 2020-02-25 RX ORDER — ROCURONIUM BROMIDE 10 MG/ML
INJECTION, SOLUTION INTRAVENOUS AS NEEDED
Status: DISCONTINUED | OUTPATIENT
Start: 2020-02-25 | End: 2020-02-25 | Stop reason: SURG

## 2020-02-25 RX ORDER — NEOSTIGMINE METHYLSULFATE 5 MG/5 ML
SYRINGE (ML) INTRAVENOUS AS NEEDED
Status: DISCONTINUED | OUTPATIENT
Start: 2020-02-25 | End: 2020-02-25 | Stop reason: SURG

## 2020-02-25 RX ORDER — MIDAZOLAM HYDROCHLORIDE 1 MG/ML
INJECTION INTRAMUSCULAR; INTRAVENOUS AS NEEDED
Status: DISCONTINUED | OUTPATIENT
Start: 2020-02-25 | End: 2020-02-25 | Stop reason: SURG

## 2020-02-25 RX ORDER — KETAMINE HYDROCHLORIDE 10 MG/ML
INJECTION INTRAMUSCULAR; INTRAVENOUS AS NEEDED
Status: DISCONTINUED | OUTPATIENT
Start: 2020-02-25 | End: 2020-02-25 | Stop reason: SURG

## 2020-02-25 RX ORDER — DEXAMETHASONE SODIUM PHOSPHATE 4 MG/ML
INJECTION, SOLUTION INTRA-ARTICULAR; INTRALESIONAL; INTRAMUSCULAR; INTRAVENOUS; SOFT TISSUE AS NEEDED
Status: DISCONTINUED | OUTPATIENT
Start: 2020-02-25 | End: 2020-02-25 | Stop reason: SURG

## 2020-02-25 RX ORDER — SODIUM CHLORIDE 0.9 % (FLUSH) 0.9 %
10 SYRINGE (ML) INJECTION EVERY 12 HOURS SCHEDULED
Status: DISCONTINUED | OUTPATIENT
Start: 2020-02-25 | End: 2020-02-25 | Stop reason: HOSPADM

## 2020-02-25 RX ORDER — IPRATROPIUM BROMIDE AND ALBUTEROL SULFATE 2.5; .5 MG/3ML; MG/3ML
3 SOLUTION RESPIRATORY (INHALATION) ONCE AS NEEDED
Status: DISCONTINUED | OUTPATIENT
Start: 2020-02-25 | End: 2020-02-25 | Stop reason: HOSPADM

## 2020-02-25 RX ORDER — HYDROMORPHONE HCL 110MG/55ML
0.5 PATIENT CONTROLLED ANALGESIA SYRINGE INTRAVENOUS
Status: DISCONTINUED | OUTPATIENT
Start: 2020-02-25 | End: 2020-02-25 | Stop reason: HOSPADM

## 2020-02-25 RX ORDER — MEPERIDINE HYDROCHLORIDE 25 MG/ML
12.5 INJECTION INTRAMUSCULAR; INTRAVENOUS; SUBCUTANEOUS
Status: DISCONTINUED | OUTPATIENT
Start: 2020-02-25 | End: 2020-02-25 | Stop reason: HOSPADM

## 2020-02-25 RX ORDER — SODIUM CHLORIDE 0.9 % (FLUSH) 0.9 %
3-10 SYRINGE (ML) INJECTION AS NEEDED
Status: DISCONTINUED | OUTPATIENT
Start: 2020-02-25 | End: 2020-02-25 | Stop reason: HOSPADM

## 2020-02-25 RX ORDER — HYDROMORPHONE HCL 110MG/55ML
PATIENT CONTROLLED ANALGESIA SYRINGE INTRAVENOUS AS NEEDED
Status: DISCONTINUED | OUTPATIENT
Start: 2020-02-25 | End: 2020-02-25 | Stop reason: SURG

## 2020-02-25 RX ORDER — SODIUM CHLORIDE 0.9 % (FLUSH) 0.9 %
10 SYRINGE (ML) INJECTION AS NEEDED
Status: DISCONTINUED | OUTPATIENT
Start: 2020-02-25 | End: 2020-02-25 | Stop reason: HOSPADM

## 2020-02-25 RX ORDER — KETOROLAC TROMETHAMINE 30 MG/ML
30 INJECTION, SOLUTION INTRAMUSCULAR; INTRAVENOUS ONCE
Status: COMPLETED | OUTPATIENT
Start: 2020-02-25 | End: 2020-02-25

## 2020-02-25 RX ORDER — HYDRALAZINE HYDROCHLORIDE 20 MG/ML
5 INJECTION INTRAMUSCULAR; INTRAVENOUS
Status: DISCONTINUED | OUTPATIENT
Start: 2020-02-25 | End: 2020-02-25 | Stop reason: HOSPADM

## 2020-02-25 RX ORDER — FENTANYL CITRATE 50 UG/ML
INJECTION, SOLUTION INTRAMUSCULAR; INTRAVENOUS AS NEEDED
Status: DISCONTINUED | OUTPATIENT
Start: 2020-02-25 | End: 2020-02-25 | Stop reason: SURG

## 2020-02-25 RX ORDER — PHENYLEPHRINE HCL IN 0.9% NACL 0.5 MG/5ML
.5-3 SYRINGE (ML) INTRAVENOUS
Status: DISCONTINUED | OUTPATIENT
Start: 2020-02-25 | End: 2020-02-25 | Stop reason: HOSPADM

## 2020-02-25 RX ORDER — LIDOCAINE HYDROCHLORIDE 20 MG/ML
INJECTION, SOLUTION EPIDURAL; INFILTRATION; INTRACAUDAL; PERINEURAL AS NEEDED
Status: DISCONTINUED | OUTPATIENT
Start: 2020-02-25 | End: 2020-02-25 | Stop reason: SURG

## 2020-02-25 RX ORDER — ACETAMINOPHEN 650 MG/1
650 SUPPOSITORY RECTAL ONCE AS NEEDED
Status: DISCONTINUED | OUTPATIENT
Start: 2020-02-25 | End: 2020-02-25 | Stop reason: HOSPADM

## 2020-02-25 RX ORDER — PROPOFOL 10 MG/ML
VIAL (ML) INTRAVENOUS AS NEEDED
Status: DISCONTINUED | OUTPATIENT
Start: 2020-02-25 | End: 2020-02-25 | Stop reason: SURG

## 2020-02-25 RX ORDER — LORAZEPAM 2 MG/ML
0.5 INJECTION INTRAMUSCULAR ONCE
Status: COMPLETED | OUTPATIENT
Start: 2020-02-25 | End: 2020-02-25

## 2020-02-25 RX ORDER — PROMETHAZINE HYDROCHLORIDE 25 MG/1
25 TABLET ORAL ONCE AS NEEDED
Status: DISCONTINUED | OUTPATIENT
Start: 2020-02-25 | End: 2020-02-25 | Stop reason: SDUPTHER

## 2020-02-25 RX ORDER — GLYCOPYRROLATE 1 MG/5 ML
SYRINGE (ML) INTRAVENOUS AS NEEDED
Status: DISCONTINUED | OUTPATIENT
Start: 2020-02-25 | End: 2020-02-25 | Stop reason: SURG

## 2020-02-25 RX ADMIN — KETOROLAC TROMETHAMINE 30 MG: 30 INJECTION, SOLUTION INTRAMUSCULAR at 14:47

## 2020-02-25 RX ADMIN — MIDAZOLAM 2 MG: 1 INJECTION INTRAMUSCULAR; INTRAVENOUS at 11:07

## 2020-02-25 RX ADMIN — DEXAMETHASONE SODIUM PHOSPHATE 8 MG: 4 INJECTION, SOLUTION INTRAMUSCULAR; INTRAVENOUS at 11:30

## 2020-02-25 RX ADMIN — PROPOFOL 100 MG: 10 INJECTION, EMULSION INTRAVENOUS at 11:12

## 2020-02-25 RX ADMIN — Medication 3 MG: at 13:11

## 2020-02-25 RX ADMIN — ONDANSETRON 4 MG: 2 INJECTION INTRAMUSCULAR; INTRAVENOUS at 13:07

## 2020-02-25 RX ADMIN — SODIUM CHLORIDE, SODIUM LACTATE, POTASSIUM CHLORIDE, AND CALCIUM CHLORIDE 9 ML/HR: 600; 310; 30; 20 INJECTION, SOLUTION INTRAVENOUS at 08:40

## 2020-02-25 RX ADMIN — LIDOCAINE HYDROCHLORIDE 100 MG: 20 INJECTION, SOLUTION EPIDURAL; INFILTRATION; INTRACAUDAL; PERINEURAL at 11:12

## 2020-02-25 RX ADMIN — FENTANYL CITRATE 100 MCG: 50 INJECTION, SOLUTION INTRAMUSCULAR; INTRAVENOUS at 11:07

## 2020-02-25 RX ADMIN — ROCURONIUM BROMIDE 40 MG: 10 INJECTION, SOLUTION INTRAVENOUS at 11:12

## 2020-02-25 RX ADMIN — KETAMINE HYDROCHLORIDE 25 MG: 10 INJECTION INTRAMUSCULAR; INTRAVENOUS at 11:30

## 2020-02-25 RX ADMIN — Medication 0.4 MG: at 13:11

## 2020-02-25 RX ADMIN — PROPOFOL 80 MCG/KG/MIN: 10 INJECTION, EMULSION INTRAVENOUS at 11:12

## 2020-02-25 RX ADMIN — CEFAZOLIN SODIUM 2 G: 1 INJECTION, POWDER, FOR SOLUTION INTRAMUSCULAR; INTRAVENOUS at 11:22

## 2020-02-25 RX ADMIN — LIDOCAINE HYDROCHLORIDE 1 EACH: 40 SOLUTION TOPICAL at 11:14

## 2020-02-25 RX ADMIN — LORAZEPAM 0.5 MG: 2 INJECTION INTRAMUSCULAR; INTRAVENOUS at 09:59

## 2020-02-25 RX ADMIN — PROPOFOL: 10 INJECTION, EMULSION INTRAVENOUS at 12:42

## 2020-02-25 RX ADMIN — KETAMINE HYDROCHLORIDE 25 MG: 10 INJECTION INTRAMUSCULAR; INTRAVENOUS at 11:55

## 2020-02-25 RX ADMIN — HYDROMORPHONE HYDROCHLORIDE 0.5 MG: 2 INJECTION, SOLUTION INTRAMUSCULAR; INTRAVENOUS; SUBCUTANEOUS at 14:11

## 2020-02-25 RX ADMIN — HYDROMORPHONE HYDROCHLORIDE 2 MG: 2 INJECTION INTRAMUSCULAR; INTRAVENOUS; SUBCUTANEOUS at 11:41

## 2020-02-25 NOTE — ANESTHESIA POSTPROCEDURE EVALUATION
Patient: Lynda Nolen    Procedure Summary     Date:  02/25/20 Room / Location:  [facility] OR 09 / [facility] MAIN OR    Anesthesia Start:  1106 Anesthesia Stop:  1318    Procedure:  ANTERIOR CERVICAL DISCECTOMY AND FUSION CERVICAL FIVE THROUGH CERVICAL SIX (N/A Spine Cervical) Diagnosis:       Cervical radiculopathy due to intervertebral disc disorder      (Cervical radiculopathy due to intervertebral disc disorder [M50.10])    Surgeon:  Magno Blackwell MD Provider:  Brandt Mesa MD    Anesthesia Type:  general ASA Status:  2          Anesthesia Type: general    Vitals  Vitals Value Taken Time   /70 2/25/2020  2:15 PM   Temp 98 °F (36.7 °C) 2/25/2020  1:18 PM   Pulse 87 2/25/2020  2:17 PM   Resp 15 2/25/2020  2:03 PM   SpO2 85 % 2/25/2020  2:17 PM   Vitals shown include unvalidated device data.        Post Anesthesia Care and Evaluation    Patient location during evaluation: bedside  Patient participation: complete - patient participated  Level of consciousness: awake  Pain score: 0  Pain management: adequate  Airway patency: patent  Anesthetic complications: No anesthetic complications  PONV Status: none  Cardiovascular status: acceptable  Respiratory status: acceptable  Hydration status: acceptable

## 2020-02-25 NOTE — OP NOTE
CERVICAL DISCECTOMY ANTERIOR WITH FUSION  Procedure Report    Patient Name:  Lynda Nolen  YOB: 1985    Date of Surgery:  2/25/2020     Indications:  Mrs. Nolen is a 34-year-old female who suffers from left C6 radiculopathy secondary to a C5-6 foraminal stenosis and herniated disc.  She is here today for C5-6 anterior cervical discectomy and arthrodesis after failing conservative measures.    Pre-op Diagnosis:   Cervical radiculopathy due to intervertebral disc disorder [M50.10]       Post-Op Diagnosis Codes:     * Cervical radiculopathy due to intervertebral disc disorder [M50.10]    Procedure/CPT® Codes:  1.  C5-6 intracervical discectomy and arthrodesis (Medtronic 19mm Zevo plate)  2.  Use of titanium cage (Medtronic 7 x 12 x 14 mm Titan cage)  3.  Use of allograft (Bioventus Osteoamp)    Procedure(s):  ANTERIOR CERVICAL DISCECTOMY AND FUSION CERVICAL FIVE THROUGH CERVICAL SIX    Staff:  Surgeon(s):  Magno Blackwell MD         Anesthesia: General    Estimated Blood Loss: 10 ml    Implants:    Implant Name Type Inv. Item Serial No.  Lot No. LRB No. Used   ALLOGRFT SPNG OSTEOAMP COMPRESSIBLE SM TALL [id number] - [id number] - [id number] Implant ALLOGRFT SPNG OSTEOAMP COMPRESSIBLE SM TALL [id number] [employer] . N/A 1   SPACR LRD ENDOSKELETON NANOLOCK TC 6D [id number] SM - [id number] Implant SPACR LRD ENDOSKELETON NANOLOCK TC 6D [id number]  [employer] [id number] N/A 1   PLT CERV ZEVO 1LVL 19MM - [id number] Implant PLT CERV ZEVO 1LVL 19MM  [employer] . N/A 1   SCRW ZEVO BRENDEN SD 3.5X13MM - [id number] Implant SCRW ZEVO BRENDEN SD 3.5X13MM  [employer] . N/A 4       Specimen:          None        Findings: dictated    Complications: none    Description of Procedure: The patient was placed under general endotracheal anesthesia, intubated, and placed on the operating table in the supine position.  The neuro monitoring technician then placed electrodes for free running EMG  and SSEP.  A transverse shoulder roll was then placed in the head was hyperextended and rested on a surgical pillow.   All pressure points were adequately padded and inspected and the arms were tucked.  The neck was prepped and draped in a sterile fashion.  An incision was made in the right lower portion of the neck with the midportion of the incision being at the medial border of the sternocleidomastoid muscle.  Hemostasis was achieved with bipolar cautery and using pickups and Metzenbaum scissors the subcutaneous tissue was dissected off of the platysma.  At this time the platysma was divided longitudinally and dissection was carried down through the middle cervical fascia, between the tracheoesophageal complex and carotid complex.  Dissection was carried above the omohyoid muscle which had been mobilized.  Dissection was carried down to the anterior aspect of the cervical spine.  A needle was placed in the first available disc space.  Intraoperative x-ray demonstrated this to be C5-6.  Using Bovie cautery the soft tissue was dissected off of the  C5-6 segments and the longus colli muscles were elevated off both sides of the anterior cervical spine using Bovie cautery.  Trimline retractors were then placed and used to hold the esophagus, trachea, and recurrent laryngeal bundle medially and the carotid bundle and its contents laterally.  The disc space at the C5-6 level was incised with an 11 blade scalpel and disc material was removed with the 0 and 3-0 up-biting and straight curettes. The pituitary was also used to remove disc material.  Following this the posterior lip of the vertebral body was drilled off in combination with the uncovertebral joints on each side.  The posterior longitudinal ligament was then opened and removed from foramen to foramen completely decompressing the spinal cord and the nerve roots.  Once the nerve roots were completely decompressed bleeding was controlled with a combination of the  bipolar cautery, FloSeal.  Once the bleeding was stopped the disc space was sized and a 7 x 12 x 14 mm Titan exoskeleton cage was packed with the allograft and tapped into the disc space under direct vision.    A 19 mm Zevo plate was then attached to the front of the cervical spine using 13 mm screws.  The retractors were removed and final x-rays taken. Bleeding was controlled with the FloSeal which was then irrigated out.  Inspection demonstrated hemostasis.  The incision was closed in layers, dressed and the patient was taken to the recovery room in stable condition.  Sponge instrument and needle counts were correct at the end of the procedure.          Magno Blackwell MD     Date: 2/25/2020  Time: 5:13 PM

## 2020-02-25 NOTE — DISCHARGE INSTRUCTIONS
Lifting restriction  Of 25 pounds. Okay to resume driving next week. Last dose of pain med at 2:47 pm.

## 2020-02-25 NOTE — ANESTHESIA PREPROCEDURE EVALUATION
Anesthesia Evaluation     Patient summary reviewed and Nursing notes reviewed   NPO Solid Status: > 8 hours  NPO Liquid Status: > 8 hours           Airway   Mallampati: I  TM distance: >3 FB  Neck ROM: full  No difficulty expected  Dental - normal exam     Pulmonary - normal exam   (+) a smoker Current Smoked day of surgery, sleep apnea on CPAP,   Cardiovascular - normal exam    (+) hypertension,       Neuro/Psych  (+) psychiatric history Anxiety, Depression and Bipolar,     GI/Hepatic/Renal/Endo    (+)  GERD,      Musculoskeletal     Abdominal  - normal exam    Bowel sounds: normal.   Substance History - negative use     OB/GYN negative ob/gyn ROS         Other   arthritis,                    Anesthesia Plan    ASA 2     general   (Ativan 0.5 mg IV prn anxiety)  intravenous induction     Anesthetic plan, all risks, benefits, and alternatives have been provided, discussed and informed consent has been obtained with: patient.

## 2020-02-25 NOTE — ANESTHESIA PROCEDURE NOTES
Airway  Urgency: elective    Date/Time: 2/25/2020 11:14 AM  Airway not difficult    General Information and Staff    Patient location during procedure: OR    Indications and Patient Condition  Indications for airway management: airway protection    Preoxygenated: yes  MILS maintained throughout  Mask difficulty assessment: 1 - vent by mask    Final Airway Details  Final airway type: endotracheal airway      Successful airway: ETT  Cuffed: yes   Successful intubation technique: video laryngoscopy  Facilitating devices/methods: intubating stylet  Endotracheal tube insertion site: oral  Blade type: glidscope.  Blade size: 3  ETT size (mm): 7.0  Cormack-Lehane Classification: grade I - full view of glottis  Placement verified by: chest auscultation and capnometry   Measured from: lips  ETT/EBT  to lips (cm): 22  Number of attempts at approach: 1  Assessment: lips, teeth, and gum same as pre-op and atraumatic intubation

## 2020-02-26 ENCOUNTER — TELEPHONE (OUTPATIENT)
Dept: NEUROSURGERY | Facility: CLINIC | Age: 35
End: 2020-02-26

## 2020-02-26 NOTE — TELEPHONE ENCOUNTER
Patient called stating she had surgery with Dr. Blackwell yesterday.  She said that she is in a lot of pain and she was sent home with Ultram.  She also stated that she currently takes suboxone.      I tried calling patient back but had to Leave message for patient to return my phone call.

## 2020-02-28 ENCOUNTER — APPOINTMENT (OUTPATIENT)
Dept: GENERAL RADIOLOGY | Facility: HOSPITAL | Age: 35
End: 2020-02-28

## 2020-02-28 ENCOUNTER — HOSPITAL ENCOUNTER (EMERGENCY)
Facility: HOSPITAL | Age: 35
Discharge: HOME | End: 2020-02-29
Attending: EMERGENCY MEDICINE | Admitting: EMERGENCY MEDICINE

## 2020-02-28 ENCOUNTER — APPOINTMENT (OUTPATIENT)
Dept: CT IMAGING | Facility: HOSPITAL | Age: 35
End: 2020-02-28

## 2020-02-28 DIAGNOSIS — M50.10 CERVICAL RADICULOPATHY DUE TO INTERVERTEBRAL DISC DISORDER: ICD-10-CM

## 2020-02-28 DIAGNOSIS — G89.18 POST-OPERATIVE PAIN: Primary | ICD-10-CM

## 2020-02-28 DIAGNOSIS — R07.9 CHEST PAIN, UNSPECIFIED TYPE: ICD-10-CM

## 2020-02-28 LAB
ALBUMIN SERPL-MCNC: 5 G/DL (ref 3.5–5.2)
ALBUMIN/GLOB SERPL: 1.7 G/DL
ALP SERPL-CCNC: 78 U/L (ref 39–117)
ALT SERPL W P-5'-P-CCNC: 27 U/L (ref 1–33)
ANION GAP SERPL CALCULATED.3IONS-SCNC: 15 MMOL/L (ref 5–15)
AST SERPL-CCNC: 27 U/L (ref 1–32)
BASOPHILS # BLD AUTO: 0 10*3/MM3 (ref 0–0.2)
BASOPHILS NFR BLD AUTO: 0.3 % (ref 0–1.5)
BILIRUB SERPL-MCNC: 0.2 MG/DL (ref 0.2–1.2)
BUN BLD-MCNC: 8 MG/DL (ref 6–20)
BUN/CREAT SERPL: 10.3 (ref 7–25)
CALCIUM SPEC-SCNC: 10 MG/DL (ref 8.6–10.5)
CHLORIDE SERPL-SCNC: 98 MMOL/L (ref 98–107)
CO2 SERPL-SCNC: 29 MMOL/L (ref 22–29)
CREAT BLD-MCNC: 0.78 MG/DL (ref 0.57–1)
DEPRECATED RDW RBC AUTO: 42.9 FL (ref 37–54)
EOSINOPHIL # BLD AUTO: 0.2 10*3/MM3 (ref 0–0.4)
EOSINOPHIL NFR BLD AUTO: 1.9 % (ref 0.3–6.2)
ERYTHROCYTE [DISTWIDTH] IN BLOOD BY AUTOMATED COUNT: 13.3 % (ref 12.3–15.4)
GFR SERPL CREATININE-BSD FRML MDRD: 84 ML/MIN/1.73
GLOBULIN UR ELPH-MCNC: 2.9 GM/DL
GLUCOSE BLD-MCNC: 109 MG/DL (ref 65–99)
HCT VFR BLD AUTO: 44.2 % (ref 34–46.6)
HGB BLD-MCNC: 15.1 G/DL (ref 12–15.9)
HOLD SPECIMEN: NORMAL
HOLD SPECIMEN: NORMAL
INR PPP: 0.96 (ref 0.9–1.1)
LYMPHOCYTES # BLD AUTO: 1.9 10*3/MM3 (ref 0.7–3.1)
LYMPHOCYTES NFR BLD AUTO: 20.5 % (ref 19.6–45.3)
MCH RBC QN AUTO: 31.1 PG (ref 26.6–33)
MCHC RBC AUTO-ENTMCNC: 34.2 G/DL (ref 31.5–35.7)
MCV RBC AUTO: 91.1 FL (ref 79–97)
MONOCYTES # BLD AUTO: 0.5 10*3/MM3 (ref 0.1–0.9)
MONOCYTES NFR BLD AUTO: 5.6 % (ref 5–12)
NEUTROPHILS # BLD AUTO: 6.8 10*3/MM3 (ref 1.7–7)
NEUTROPHILS NFR BLD AUTO: 71.7 % (ref 42.7–76)
NRBC BLD AUTO-RTO: 0.1 /100 WBC (ref 0–0.2)
PLATELET # BLD AUTO: 233 10*3/MM3 (ref 140–450)
PMV BLD AUTO: 8.3 FL (ref 6–12)
POTASSIUM BLD-SCNC: 3.4 MMOL/L (ref 3.5–5.2)
PROT SERPL-MCNC: 7.9 G/DL (ref 6–8.5)
PROTHROMBIN TIME: 10.2 SECONDS (ref 9.6–11.7)
RBC # BLD AUTO: 4.85 10*6/MM3 (ref 3.77–5.28)
S PYO AG THROAT QL: NEGATIVE
SODIUM BLD-SCNC: 142 MMOL/L (ref 136–145)
TROPONIN T SERPL-MCNC: <0.01 NG/ML (ref 0–0.03)
TROPONIN T SERPL-MCNC: <0.01 NG/ML (ref 0–0.03)
WBC NRBC COR # BLD: 9.4 10*3/MM3 (ref 3.4–10.8)
WHOLE BLOOD HOLD SPECIMEN: NORMAL
WHOLE BLOOD HOLD SPECIMEN: NORMAL

## 2020-02-28 PROCEDURE — 25010000002 METHYLPREDNISOLONE PER 125 MG: Performed by: EMERGENCY MEDICINE

## 2020-02-28 PROCEDURE — 93005 ELECTROCARDIOGRAM TRACING: CPT | Performed by: EMERGENCY MEDICINE

## 2020-02-28 PROCEDURE — 85610 PROTHROMBIN TIME: CPT | Performed by: EMERGENCY MEDICINE

## 2020-02-28 PROCEDURE — 25010000002 DIPHENHYDRAMINE PER 50 MG: Performed by: EMERGENCY MEDICINE

## 2020-02-28 PROCEDURE — 25010000002 LORAZEPAM PER 2 MG: Performed by: EMERGENCY MEDICINE

## 2020-02-28 PROCEDURE — 0 IOPAMIDOL PER 1 ML: Performed by: EMERGENCY MEDICINE

## 2020-02-28 PROCEDURE — 96375 TX/PRO/DX INJ NEW DRUG ADDON: CPT

## 2020-02-28 PROCEDURE — 70490 CT SOFT TISSUE NECK W/O DYE: CPT

## 2020-02-28 PROCEDURE — 99284 EMERGENCY DEPT VISIT MOD MDM: CPT

## 2020-02-28 PROCEDURE — 96374 THER/PROPH/DIAG INJ IV PUSH: CPT

## 2020-02-28 PROCEDURE — 85025 COMPLETE CBC W/AUTO DIFF WBC: CPT | Performed by: EMERGENCY MEDICINE

## 2020-02-28 PROCEDURE — 71045 X-RAY EXAM CHEST 1 VIEW: CPT

## 2020-02-28 PROCEDURE — 71275 CT ANGIOGRAPHY CHEST: CPT

## 2020-02-28 PROCEDURE — 87651 STREP A DNA AMP PROBE: CPT | Performed by: EMERGENCY MEDICINE

## 2020-02-28 PROCEDURE — 84484 ASSAY OF TROPONIN QUANT: CPT | Performed by: EMERGENCY MEDICINE

## 2020-02-28 PROCEDURE — 80053 COMPREHEN METABOLIC PANEL: CPT | Performed by: EMERGENCY MEDICINE

## 2020-02-28 RX ORDER — SODIUM CHLORIDE 0.9 % (FLUSH) 0.9 %
10 SYRINGE (ML) INJECTION AS NEEDED
Status: DISCONTINUED | OUTPATIENT
Start: 2020-02-28 | End: 2020-02-29 | Stop reason: HOSPADM

## 2020-02-28 RX ORDER — DIPHENHYDRAMINE HYDROCHLORIDE 50 MG/ML
25 INJECTION INTRAMUSCULAR; INTRAVENOUS ONCE
Status: COMPLETED | OUTPATIENT
Start: 2020-02-28 | End: 2020-02-28

## 2020-02-28 RX ORDER — LORAZEPAM 2 MG/ML
1 INJECTION INTRAMUSCULAR ONCE
Status: COMPLETED | OUTPATIENT
Start: 2020-02-28 | End: 2020-02-28

## 2020-02-28 RX ORDER — METHYLPREDNISOLONE SODIUM SUCCINATE 125 MG/2ML
125 INJECTION, POWDER, LYOPHILIZED, FOR SOLUTION INTRAMUSCULAR; INTRAVENOUS ONCE
Status: COMPLETED | OUTPATIENT
Start: 2020-02-28 | End: 2020-02-28

## 2020-02-28 RX ORDER — KETOROLAC TROMETHAMINE 15 MG/ML
15 INJECTION, SOLUTION INTRAMUSCULAR; INTRAVENOUS ONCE
Status: DISCONTINUED | OUTPATIENT
Start: 2020-02-28 | End: 2020-02-28

## 2020-02-28 RX ADMIN — DIPHENHYDRAMINE HYDROCHLORIDE 25 MG: 50 INJECTION, SOLUTION INTRAMUSCULAR; INTRAVENOUS at 20:38

## 2020-02-28 RX ADMIN — SODIUM CHLORIDE 1000 ML: 900 INJECTION, SOLUTION INTRAVENOUS at 23:11

## 2020-02-28 RX ADMIN — IOPAMIDOL 100 ML: 755 INJECTION, SOLUTION INTRAVENOUS at 21:51

## 2020-02-28 RX ADMIN — LORAZEPAM 1 MG: 2 INJECTION INTRAMUSCULAR; INTRAVENOUS at 23:11

## 2020-02-28 RX ADMIN — METHYLPREDNISOLONE SODIUM SUCCINATE 125 MG: 125 INJECTION, POWDER, FOR SOLUTION INTRAMUSCULAR; INTRAVENOUS at 20:38

## 2020-02-28 NOTE — TELEPHONE ENCOUNTER
After speaking with Dr. Blackwell, he stated that he does not feel comfortable prescribing anything stronger than tramadol since she taking suboxone.  I called and LM for patient.

## 2020-02-29 VITALS
DIASTOLIC BLOOD PRESSURE: 82 MMHG | TEMPERATURE: 98.1 F | OXYGEN SATURATION: 98 % | SYSTOLIC BLOOD PRESSURE: 132 MMHG | BODY MASS INDEX: 26.68 KG/M2 | HEART RATE: 111 BPM | WEIGHT: 141.31 LBS | HEIGHT: 61 IN | RESPIRATION RATE: 18 BRPM

## 2020-02-29 RX ORDER — DEXTROMETHORPHAN HYDROBROMIDE AND PROMETHAZINE HYDROCHLORIDE 15; 6.25 MG/5ML; MG/5ML
2.5 SYRUP ORAL 4 TIMES DAILY PRN
Qty: 50 ML | Refills: 0 | Status: SHIPPED | OUTPATIENT
Start: 2020-02-29 | End: 2020-03-10 | Stop reason: ALTCHOICE

## 2020-03-03 ENCOUNTER — TELEPHONE (OUTPATIENT)
Dept: NEUROSURGERY | Facility: CLINIC | Age: 35
End: 2020-03-03

## 2020-03-03 NOTE — TELEPHONE ENCOUNTER
Patients boyfriend called stating that she is in a lot of pain and she is having trouble swallowing.  LM for patient stating again that Dr. Blackwell was no comfortable prescribing anything stronger than tramadol since she is taking suboxone.  I also stated that she can having trouble swallowing up to a month after surgery and would recommend taking smaller bites and stay on a soft food diet.

## 2020-03-10 ENCOUNTER — OFFICE VISIT (OUTPATIENT)
Dept: NEUROSURGERY | Facility: CLINIC | Age: 35
End: 2020-03-10

## 2020-03-10 VITALS
HEART RATE: 120 BPM | BODY MASS INDEX: 26.36 KG/M2 | SYSTOLIC BLOOD PRESSURE: 168 MMHG | WEIGHT: 139.6 LBS | DIASTOLIC BLOOD PRESSURE: 84 MMHG | HEIGHT: 61 IN

## 2020-03-10 DIAGNOSIS — Z98.1 STATUS POST CERVICAL SPINAL FUSION: ICD-10-CM

## 2020-03-10 DIAGNOSIS — F31.9 BIPOLAR AFFECTIVE DISORDER, REMISSION STATUS UNSPECIFIED (HCC): ICD-10-CM

## 2020-03-10 DIAGNOSIS — M50.10 CERVICAL RADICULOPATHY DUE TO INTERVERTEBRAL DISC DISORDER: Primary | ICD-10-CM

## 2020-03-10 DIAGNOSIS — M51.26 DISPLACEMENT OF LUMBAR INTERVERTEBRAL DISC WITHOUT MYELOPATHY: ICD-10-CM

## 2020-03-10 PROCEDURE — 99024 POSTOP FOLLOW-UP VISIT: CPT | Performed by: NEUROLOGICAL SURGERY

## 2020-03-10 RX ORDER — LAMOTRIGINE 200 MG/1
TABLET ORAL
COMMUNITY
Start: 2020-03-04 | End: 2022-12-01

## 2020-03-10 RX ORDER — SUMATRIPTAN 100 MG/1
TABLET, FILM COATED ORAL
COMMUNITY
Start: 2020-03-04

## 2020-03-10 RX ORDER — METHOCARBAMOL 750 MG/1
TABLET, FILM COATED ORAL
COMMUNITY
Start: 2020-03-05 | End: 2020-04-23

## 2020-03-10 RX ORDER — METHYLPHENIDATE HYDROCHLORIDE 36 MG/1
36 TABLET ORAL 2 TIMES DAILY
COMMUNITY
Start: 2020-03-03 | End: 2022-12-01

## 2020-03-10 NOTE — PROGRESS NOTES
"Subjective   Lyndajarvis Nolen is a 34 y.o. female.     Chief Complaint   Patient presents with   • Post-op     ACDF C5-C6 2/25/2020     Visit Vitals  Ht 154.9 cm (61\")   BMI 26.70 kg/m²       History of Present Illness: Mrs. Nolen is here today for 2-week follow-up after undergoing a C5-6 intracervical discectomy and arthrodesis.  The patient states the arm pain is better but she has been describing neck pain in the posterior cervical region.  Once or CT scan was performed demonstrating no soft tissue abnormality or malposition of the hardware.  She is very upset today stating that I did not tell her that she would have this much pain.  She was not happy with the discharge dose of Ultram.  We had a lengthy discussion today that I did discuss with her before the surgery that we would need to coordinate postoperative pain care management with her physician who is been writing Suboxone.  I do not have comfort in writing more than Ultram on top of Suboxone and we did schedule her for follow-up with pain management.  I still feel today she has some facet mediated pain and she is tender in the paraspinal musculature and I contacted my pain management specialist to see how soon we can get her in for dedicated facet blocks to help with her pain and also to assist with medication adjustments.  We attempted to contact her physician at [facility] who is been prescribing the Suboxone as well today.  We will still place the repeat consult for pain management to see if they can help.  The patient was upset and left the office today without completing the evaluation.    The following portions of the patient's history were reviewed and updated as appropriate: allergies, current medications, past family history, past medical history, past social history, past surgical history and problem list.    Review of Systems         Past Surgical History:   Procedure Laterality Date   • ANTERIOR CERVICAL DISCECTOMY W/ FUSION N/A " 2/25/2020    Procedure: ANTERIOR CERVICAL DISCECTOMY AND FUSION CERVICAL FIVE THROUGH CERVICAL SIX;  Surgeon: Magno Blackwell MD;  Location: [facility] MAIN OR;  Service: Neurosurgery;  Laterality: N/A;   • APPENDECTOMY  2002   • CARDIAC CATHETERIZATION      [facility] 12/16   • CERVICAL FUSION     • CHOLECYSTECTOMY  2012   • DILATATION AND CURETTAGE  2001   • OTHER SURGICAL HISTORY      FUSION BACK SURGERY (2014)   • TONSILLECTOMY         Past Medical History:   Diagnosis Date   • Anxiety    • Arthritis    • Chronic pain    • Depression    • Fibromyalgia    • GERD (gastroesophageal reflux disease)    • History of bipolar disorder    • History of goiter 01/06/2016   • History of illicit drug use    • History of suicide attempt    • Hypertension 2013   • Low back pain    • Migraines    • MARY GRACE (obstructive sleep apnea)     non compliance with bipap   • Vitamin D deficiency      Social History     Socioeconomic History   • Marital status: Single     Spouse name: Not on file   • Number of children: Not on file   • Years of education: Not on file   • Highest education level: Not on file   Tobacco Use   • Smoking status: Current Every Day Smoker     Packs/day: 0.50     Types: Cigarettes   • Smokeless tobacco: Never Used   Substance and Sexual Activity   • Alcohol use: No     Frequency: Never   • Drug use: No   • Sexual activity: Defer      Family History   Problem Relation Age of Onset   • Cancer Father    • Prostate cancer Father    • Thyroid disease Sister    • Melanoma Sister    • Heart disease Brother         MI age 35   • Diabetes Paternal Grandmother           Objective   Physical Exam  Neurologic Exam  Ortho Exam        Assessment and Plan: post op      Problems Addressed this Visit     None

## 2020-03-12 ENCOUNTER — TELEPHONE (OUTPATIENT)
Dept: PAIN MEDICINE | Facility: CLINIC | Age: 35
End: 2020-03-12

## 2020-03-12 NOTE — TELEPHONE ENCOUNTER
New request from Dr. Blackwell Patient last here in 2/2019  This is a lady seen before who is currently on Suboxone and underwent a C5-6 ACDF.  She is having significant posterior cervical pain which I think is facet mediated.  I do not feel comfortable prescribing more than Ultram on top of this and her prescriber for Suboxone is not a pain specialist.  I am requesting help in managing this young lady's pain.

## 2020-04-15 ENCOUNTER — TELEPHONE (OUTPATIENT)
Dept: NEUROSURGERY | Facility: CLINIC | Age: 35
End: 2020-04-15

## 2020-04-15 NOTE — TELEPHONE ENCOUNTER
had a vomiting spell two weeks ago and is in worse pain than before her surgery severe neck pain radiating down into arms and having headaches

## 2020-04-16 NOTE — TELEPHONE ENCOUNTER
"After speaking with Dr. Blackwell today, He told me to tell her to present to the ER.  I called patient back and she did tell me that she was not happy with the care that she had been given, and that she felt as if Dr. Blackwell was \"talking down to her\" and treated her like \"a drug seeker\".  I apologized to her for however she may have been feeling, but that Dr. Blackwell does care about his patients immensely, but the fastest route to some relief would be to present to the ER.  She thanked me for the call, and we hung up.    "

## 2020-04-23 ENCOUNTER — OFFICE VISIT (OUTPATIENT)
Dept: NEUROSURGERY | Facility: CLINIC | Age: 35
End: 2020-04-23

## 2020-04-23 DIAGNOSIS — Z98.1 S/P CERVICAL SPINAL FUSION: Primary | ICD-10-CM

## 2020-04-23 PROCEDURE — 99024 POSTOP FOLLOW-UP VISIT: CPT | Performed by: NURSE PRACTITIONER

## 2020-04-23 RX ORDER — TIZANIDINE 2 MG/1
4 TABLET ORAL EVERY 6 HOURS PRN
Qty: 60 TABLET | Refills: 2 | Status: SHIPPED | OUTPATIENT
Start: 2020-04-23 | End: 2020-06-18

## 2020-04-23 NOTE — PROGRESS NOTES
Subjective   History of Present Illness: Lynda Nolen is a 34 y.o. female called today for follow-up.    History of Present Illness    The following portions of the patient's history were reviewed and updated as appropriate: allergies, current medications, past family history, past medical history, past social history, past surgical history and problem list.    Review of Systems   Musculoskeletal: Positive for neck pain.        Ms. Nolen is 2 months postop from C5-6 cervical discectomy and arthrodesis.  Recall at her first postop appointment she was upset because she had been in a depression she would be completely pain-free following surgery.  She also was not happy that she was discharged on Ultram but recall she takes Suboxone and therefore narcotics were not prescribed.  There was an attempt to contact the prescriber at [facility] regarding the Suboxone but to no avail.  It was felt she still had facet mediated pain which is not uncommon and there was an attempt to refer her to pain management for injections.  She became upset at that suggestion stating that pain management has not worked for her in the past.  She complains today of neck pain mostly in the back of the neck that radiates to the left shoulder and into the elbow. She denies numbness in the hand, but states the hand will swell. She states she has a sense of weakness in the left arm as well, for instance when she tries to drink .   Objective     .There were no vitals taken for this visit.   There is no height or weight on file to calculate BMI.      Neurologic Exam        Assessment/Plan   Independent Review of Radiographic Studies:      I personally reviewed the images from the following studies.    No recent imaging    Medical Decision Making:      You have chosen to receive care through a telephone visit. Do you consent to use a telephone visit for your medical care today? Yes  This was a telephone visit agreed upon by the patient.  She  was in her home and I was in the office.  She is still having posterior neck pain that radiates to the left shoulder and down the elbow.  As Dr. Blackwell discussed with her at her last visit, she seems to be having some residual facet mediated pain.  He had tried to explain to her that this is not uncommon and she may respond better to the injectable options now that the nerve had been decompressed.  I did explain this to her over the phone today and she seems to understand.  She is willing to try the facet injections now.  We will refer her back to pain management.  She would also like to try some physical therapy to help with both range of motion, strength and pain relieving techniques.  We will have her follow-up in the office once she is able to complete some of the conservative treatment so in about 2 months.  We will have her get cervical films when she comes for that appointment.  I have reached out to Dr. Alexandra Manley who prescribes her Suboxone to make sure there is no reason I cannot prescribe tizanidine as Ms. Hinds states that has helped her in the past.  For now we will plan on seeing her back in 2 months and she knows to call sooner with any questions or concerns.  She understands and agrees with the plan.  Lynda was seen today for neck pain.    Diagnoses and all orders for this visit:    S/P cervical spinal fusion  -     Ambulatory Referral to Pain Management  -     Ambulatory Referral to Physical Therapy Evaluate and treat, POST OP  -     XR Spine Cervical 2 or 3 View; Future      Return in about 6 weeks (around 6/4/2020) for after PT & injections with films.

## 2020-05-19 ENCOUNTER — TREATMENT (OUTPATIENT)
Dept: PHYSICAL THERAPY | Facility: CLINIC | Age: 35
End: 2020-05-19

## 2020-05-19 DIAGNOSIS — Z98.1 S/P SPINAL FUSION: Primary | ICD-10-CM

## 2020-05-19 PROCEDURE — 97162 PT EVAL MOD COMPLEX 30 MIN: CPT | Performed by: PHYSICAL THERAPIST

## 2020-05-19 PROCEDURE — 97110 THERAPEUTIC EXERCISES: CPT | Performed by: PHYSICAL THERAPIST

## 2020-05-19 PROCEDURE — 97035 APP MDLTY 1+ULTRASOUND EA 15: CPT | Performed by: PHYSICAL THERAPIST

## 2020-05-19 NOTE — PROGRESS NOTES
Physical Therapy Initial Evaluation and Plan of Care    Patient: Lynda Nolen   : 1985  Diagnosis/ICD-10 Code:  S/P spinal fusion [Z98.1]  Referring practitioner: NATY Bartlett  Date of Initial Visit: 2020  Today's Date: 2020  Patient seen for 1 sessions           Subjective Questionnaire: NDI:30      Subjective Evaluation    History of Present Illness  Mechanism of injury: Pt is referred to therapy s/p cervical fusion on . Reports She had severe vomiting 2 weeks post op and started having a lot more pain in after that. She has pain in both sides of her neck with radiating pain down both shoulders. Ms pain and tightness. Occasional tingling sensation in her hands. Disturbed sleep unable to get find a comfortable position at night. She is in pain all the time. Very disappointed she thought she would be much better after the surgery. She has seen the surgeon once since her surgery and doesn't have anymore f/u visits scheduled. Has not been contacted by pain management.  No x-rays. Has not been able to do much at all since her surgery. She has hx of back surgery. Has a TENS unit at home.      Patient Occupation: unemployed/ on disability Quality of life: good    Pain  Current pain ratin  At best pain ratin  At worst pain ratin  Quality: sharp, radiating, burning and cramping  Relieving factors: change in position and heat  Aggravating factors: overhead activity, sleeping, prolonged positioning and repetitive movement  Progression: improved    Social Support  Lives with: spouse and young children    Hand dominance: right    Diagnostic Tests  No diagnostic tests performed    Patient Goals  Patient goals for therapy: decreased pain, increased motion, increased strength and return to sport/leisure activities           Precautions: s/p cervical fusion, post op precautions    Objective          Postural Observations  Seated posture: poor  Standing posture: fair  Correction of  posture: has no consistent effect    Additional Postural Observation Details  RS/ FHP. Corrects with vcs    Tenderness     Additional Tenderness Details  Mod TTP B UTs/ L >R  Palpable ms tightness and spasm L UTs    Active Range of Motion     Additional Active Range of Motion Details  Cervical ROM: SB and Rot with mod limitations and inc pain in L side and L UTs    UE ROM: wfl B inc pain in L should and neck with L should flex/ abd    MMT: B UE strength 4/5 for all ms groups          Assessment & Plan     Assessment  Impairments: abnormal or restricted ROM, activity intolerance, lacks appropriate home exercise program and pain with function  Assessment details: Pt is a 35 y/o f presents to therapy s/p cervical fusion on 2/25/20.   Pt presents with poor posture,  limited ROM/ strength, subjective pain and activity intolerance.  Pt exhibits the above impairments and functional limitations and would benefit from skilled Physical Therapy to address impairments and maximize function.   Prognosis: good  Functional Limitations: carrying objects, lifting, sleeping, pulling, pushing, uncomfortable because of pain, reaching overhead and unable to perform repetitive tasks  Goals  Plan Goals: STGs:  In 2 weeks  1- Pt will  report at least 252 % improvement and pain reduction   2- Pt will be independent with initial HEP   3- Pt will tolerate progression of HEP and her exercise program     LTGs: By DC   1- Pt will report at least 75 % improvement and pain reduction   2- Pt will be independent with final HEP and self management of her condition   3- Pt will improve NDI score compare to initial score at eval indicating functional improvement   4- Pt will voice readiness for DC with independent program   5- Pt will demonstrate improved posture and body mechanics    Plan  Therapy options: will be seen for skilled physical therapy services  Planned modality interventions: electrical stimulation/Turkmen stimulation and  ultrasound  Planned therapy interventions: functional ROM exercises, home exercise program, manual therapy, postural training and therapeutic activities  Frequency: 2x week  Treatment plan discussed with: patient  Plan details: Pt will be seen for 30 visits        See flow sheet for treatment detail. Received evaluation , postural ed, HEP and US for pain management.     History # of Personal Factors and/or Comorbidities: MODERATE (1-2)  Examination of Body System(s): # of elements: MODERATE (3)  Clinical Presentation: EVOLVING  Clinical Decision Making: MODERATE          Timed:         Manual Therapy:         mins  57815;     Therapeutic Exercise:    10     mins  58380;     Neuromuscular Aishwarya:        mins  16708;    Therapeutic Activity:      10    mins  64909;     Gait Training:           mins  38056;     Ultrasound:   10     mins  05823;    Ionto                                   mins   86619  Self Care                            mins   61380  Canal repositioning           mins    18324      Un-Timed:  Electrical Stimulation:         mins  77698 ( );  Dry Needling          mins self-pay  Traction          mins 18228  Low Eval          Mins  11096  Mod Eval    25      Mins  28859  High Eval                            Mins  45780  Re-Eval                               mins  35780        Timed Treatment:   30  mins   Total Treatment:    55    mins    PT SIGNATURE: Mark Anthony Gaspar PT, CLT   DATE TREATMENT INITIATED: 5/19/2020    Initial Certification  Certification Period: 8/17/2020  I certify that the therapy services are furnished while this patient is under my care.  The services outlined above are required by this patient, and will be reviewed every 90 days.     PHYSICIAN: Paula Juarez, NATY      DATE:     Please sign and return via fax to [phone].. Thank you, [facility].

## 2020-05-26 ENCOUNTER — TREATMENT (OUTPATIENT)
Dept: PHYSICAL THERAPY | Facility: CLINIC | Age: 35
End: 2020-05-26

## 2020-05-26 DIAGNOSIS — Z98.1 S/P SPINAL FUSION: Primary | ICD-10-CM

## 2020-05-26 PROCEDURE — 97110 THERAPEUTIC EXERCISES: CPT | Performed by: PHYSICAL THERAPIST

## 2020-05-26 PROCEDURE — 97140 MANUAL THERAPY 1/> REGIONS: CPT | Performed by: PHYSICAL THERAPIST

## 2020-05-26 PROCEDURE — 97035 APP MDLTY 1+ULTRASOUND EA 15: CPT | Performed by: PHYSICAL THERAPIST

## 2020-05-26 NOTE — PROGRESS NOTES
Physical Therapy Daily Progress Note    Patient: Lynda Nolen  : 1985  Referring practitioner: NATY Bartlett  Today's Date: 2020    VISIT#: 2    Subjective   Pt reports: having a lot pain unable to get comfortable at night, unable to sleep. Presently rates pain 3/10.      Objective     See Exercise, Manual, and Modality Logs for complete treatment. Continued with US / initiated manual therapy. Progressed with HEP. Dec pain reported at conclusion of today's session.     Patient Education:    Assessment & Plan     Assessment  Assessment details: Good kristan to today's treatment, dec pain and ms tightness reported at conclusion of today's session.          Progress per Plan of Care            Timed:         Manual Therapy:      16   mins  52421;     Therapeutic Exercise:   10      mins  45046;     Neuromuscular Aishwarya:        mins  04261;    Therapeutic Activity:          mins  70016;     Gait Training:           mins  17986;     Ultrasound:    12      mins  87189;    Ionto                                   mins   65780  Self Care                            mins   40327  Canal repositioning           mins    69809    Un-Timed:  Electrical Stimulation:         mins  16608 ( );  Traction          mins 71906  Low Eval          Mins  41144  Mod Eval          Mins  87905  High Eval                            Mins  01147  Re-Eval                               mins  09881    Timed Treatment:   38   mins   Total Treatment:    38    mins    Mark Anthony Gaspar PT, CLT  Physical Therapist

## 2020-05-29 ENCOUNTER — APPOINTMENT (OUTPATIENT)
Dept: PAIN MEDICINE | Facility: CLINIC | Age: 35
End: 2020-05-29

## 2020-06-18 ENCOUNTER — OFFICE VISIT (OUTPATIENT)
Dept: PAIN MEDICINE | Facility: CLINIC | Age: 35
End: 2020-06-18

## 2020-06-18 VITALS — HEIGHT: 61 IN | BODY MASS INDEX: 24.55 KG/M2 | WEIGHT: 130 LBS

## 2020-06-18 DIAGNOSIS — M79.18 MYOFASCIAL PAIN SYNDROME: ICD-10-CM

## 2020-06-18 DIAGNOSIS — M96.1 POSTLAMINECTOMY SYNDROME OF CERVICAL REGION: ICD-10-CM

## 2020-06-18 DIAGNOSIS — G89.4 CHRONIC PAIN SYNDROME: Primary | ICD-10-CM

## 2020-06-18 DIAGNOSIS — M96.1 POSTLAMINECTOMY SYNDROME OF LUMBAR REGION: ICD-10-CM

## 2020-06-18 PROCEDURE — 99214 OFFICE O/P EST MOD 30 MIN: CPT | Performed by: ANESTHESIOLOGY

## 2020-06-18 RX ORDER — METHYLPHENIDATE HYDROCHLORIDE 54 MG/1
54 TABLET ORAL DAILY
COMMUNITY
Start: 2020-06-08 | End: 2022-12-01

## 2020-06-18 RX ORDER — TIZANIDINE 4 MG/1
4 TABLET ORAL EVERY 8 HOURS PRN
Qty: 90 TABLET | Refills: 11 | Status: SHIPPED | OUTPATIENT
Start: 2020-06-18 | End: 2022-12-01

## 2020-06-18 RX ORDER — METHOCARBAMOL 750 MG/1
TABLET, FILM COATED ORAL
COMMUNITY
Start: 2020-05-27 | End: 2020-06-18 | Stop reason: SDUPTHER

## 2020-06-18 NOTE — PROGRESS NOTES
Subjective    CC Neck pain,  back pain  Lynda Nolen is a 34 y.o. female.with chronic back pain status post fusion at L4 2014, neck pain S/P ACDF C5-6, here for f/u by tel.   Continued neck pain after ACDF. Seen neurosurgery who recommended facet injections. However, she has had no relief from previous injection and reported significant myofascial pain post procedures. States neck pain is mostly left sided and associated with muscle spasms. Good relief with Zanaflex.   Chronic back pain and bilateral lower extremity pain from DDD and post-laminectomy syndrome. Denies new weakness, saddle anesthesia bladder or bowel incontinence.    Chronic pain interfering with activity and sleep.    Tried physical therapy medication without relief.      History of drug use in suboxone  program     utilizes gabapentin Cymbalta and Elavil with mild relief.    C-spine xray  Intraoperative radiographs for C5-6 ACDF  C-spine MRI 2019: Multilevel degenerative changes of cervical spine, most prominent at C5-6 where there is mild-to-moderate spinal canal stenosis. Normal marrow, normal cord.    L-spine MRI 2017 postsurgical changes with posterior fusion L4-5.  No evidence of canal stenosis.  Moderate left foraminal stenosis L4-5.  Mild foraminal narrowing bilaterally L5-S1 and L3-4 worst on the left.     Pain Assessment   Location of Pain: Lower Back, , neck pain, joint  Description of Pain: Dull/Aching, Throbbing, Stabbing  Previous Pain Rating :6  Current Pain Ratin  Aggravating Factors: Activity  Alleviating Factors: Rest, Medication    PEG Assessment   What number best describes your pain on average in the past week?6  What number best describes how, during the past week, pain has interfered with your enjoyment of life?6  What number best describes how, during the past week, pain has interfered with your general activity?  7    The following portions of the patient's history were reviewed and updated as appropriate:  allergies, current medications, past family history, past medical history, past social history, past surgical history and problem list.     has a past medical history of Anxiety, Arthritis, Chronic diarrhea, Chronic pain, Depression, Fibromyalgia, GERD (gastroesophageal reflux disease), History of bipolar disorder, History of goiter (01/06/2016), History of illicit drug use, History of suicide attempt, Hypertension (2013), Injury of back, Injury of neck, Low back pain, Migraines, MARY GRACE (obstructive sleep apnea), Shortness of breath, Substance abuse (CMS/[facility]), and Vitamin D deficiency.   has a past surgical history that includes Appendectomy (2002); Cholecystectomy (2012); Other surgical history; Dilation and curettage of uterus (2001); Cardiac catheterization; Tonsillectomy; Cervical fusion; and Anterior cervical discectomy w/ fusion (N/A, 2/25/2020).  family history includes Cancer in her father; Diabetes in her paternal grandmother; Heart disease in her brother; Melanoma in her sister; Prostate cancer in her father; Thyroid disease in her sister.  Social History     Tobacco Use   • Smoking status: Current Every Day Smoker     Packs/day: 0.50     Types: Cigarettes   • Smokeless tobacco: Never Used   Substance Use Topics   • Alcohol use: No     Frequency: Never       Review of Systems   Constitutional: Negative for chills, fatigue and fever.   HENT: Negative for swollen glands and trouble swallowing.    Respiratory: Negative.  Negative for shortness of breath.    Cardiovascular: Negative for chest pain, palpitations and leg swelling.   Gastrointestinal: Negative for nausea and vomiting.   Musculoskeletal: Positive for arthralgias, back pain and neck pain. Negative for gait problem, joint swelling, myalgias and neck stiffness.   Skin: Negative for color change, dry skin, rash and skin lesions.   Neurological: Negative for dizziness, weakness, light-headedness and headache.   Hematological: Negative for adenopathy. Does  "not bruise/bleed easily.   Psychiatric/Behavioral: Negative for behavioral problems, suicidal ideas and depressed mood.   All other systems reviewed and are negative.    Objective   Physical Exam   Constitutional: She is oriented to person, place, and time. No distress.   Neurological: She is oriented to person, place, and time.   Psychiatric: Her speech is normal.     Ht 154.9 cm (61\")   Wt 59 kg (130 lb)   BMI 24.56 kg/m²     Global pain scale and PHQ 9 on chart  Opioid risk tool high risk    Assessment/Plan   Lynda was seen today for back pain, neck pain and follow-up.    Diagnoses and all orders for this visit:    Chronic pain syndrome    Postlaminectomy syndrome of lumbar region    Postlaminectomy syndrome of cervical region    Myofascial pain syndrome    Other orders  -     tiZANidine (ZANAFLEX) 4 MG tablet; Take 1 tablet by mouth Every 8 (Eight) Hours As Needed for Muscle Spasms.    Summary  Lynda Nolen is a 34 y.o. female.with chronic back pain status post fusion at L4 05/2014, neck pain S/P ACDF C5-6, here for f/u by tel.   Continued neck pain after ACDF. Seen neurosurgery who recommended facet injections. However, she has had no relief from previous injection and reported significant myofascial pain post procedures. States neck pain is mostly left sided and associated with muscle spasms. Good relief with Zanaflex.     Discussed cervical facet injections/MBB, she declines at this time. Will continue Zanaflex. Increase to 4 mg TID.     telemedicine done to avoid coronavirus exposure.  Discussed CDC guidelines and precaution regarding current epidemic.  Unable to complete visit using a video connection to the patient. A phone visit was used to complete visit. Total time  21 minutes      RTC as needed  "

## 2020-07-14 ENCOUNTER — DOCUMENTATION (OUTPATIENT)
Dept: PHYSICAL THERAPY | Facility: CLINIC | Age: 35
End: 2020-07-14

## 2020-07-14 NOTE — PROGRESS NOTES
Discharge Summary  Discharge Summary from Physical/Occupational Therapy Report    Patient: Lynda Nolen   : 1985  Today's Date: 2020    Patient seen for 2 visits.    Discharge Status of Patient: See treatment note dated 20      Comments : pt attended therapy for 2 sessions and was a NS for the remaining of her scheduled appts. Has not returned since last visit on 20. Will be DC from therapy.        SIGNATURE: Mark Anthony Gaspar PT

## 2021-06-29 ENCOUNTER — TRANSCRIBE ORDERS (OUTPATIENT)
Dept: ADMINISTRATIVE | Facility: HOSPITAL | Age: 36
End: 2021-06-29

## 2021-06-29 DIAGNOSIS — N63.10 BREAST MASS, RIGHT: Primary | ICD-10-CM

## 2022-12-01 ENCOUNTER — OFFICE VISIT (OUTPATIENT)
Dept: SURGERY | Facility: CLINIC | Age: 37
End: 2022-12-01

## 2022-12-01 VITALS
DIASTOLIC BLOOD PRESSURE: 107 MMHG | BODY MASS INDEX: 24.05 KG/M2 | TEMPERATURE: 99.1 F | SYSTOLIC BLOOD PRESSURE: 172 MMHG | WEIGHT: 127.4 LBS | HEIGHT: 61 IN | HEART RATE: 93 BPM

## 2022-12-01 DIAGNOSIS — R10.84 ABDOMINAL PAIN, GENERALIZED: Primary | ICD-10-CM

## 2022-12-01 PROCEDURE — 99203 OFFICE O/P NEW LOW 30 MIN: CPT | Performed by: SURGERY

## 2022-12-01 RX ORDER — ACETAMINOPHEN 160 MG
TABLET,DISINTEGRATING ORAL
COMMUNITY
Start: 2022-11-21

## 2022-12-01 RX ORDER — AMITRIPTYLINE HYDROCHLORIDE 50 MG/1
TABLET, FILM COATED ORAL
COMMUNITY
Start: 2022-11-21

## 2022-12-01 RX ORDER — ONDANSETRON 4 MG/1
TABLET, FILM COATED ORAL
COMMUNITY
Start: 2022-11-04

## 2022-12-01 RX ORDER — PROPRANOLOL HYDROCHLORIDE 10 MG/1
1 TABLET ORAL
COMMUNITY
Start: 2022-11-21

## 2022-12-01 RX ORDER — DEXMETHYLPHENIDATE HYDROCHLORIDE 20 MG/1
CAPSULE, EXTENDED RELEASE ORAL
COMMUNITY
Start: 2022-11-22

## 2022-12-01 RX ORDER — LAMOTRIGINE 100 MG/1
1 TABLET ORAL DAILY
COMMUNITY
Start: 2022-11-21

## 2022-12-01 RX ORDER — NICOTINE 21 MG/24HR
PATCH, TRANSDERMAL 24 HOURS TRANSDERMAL
COMMUNITY
Start: 2022-11-04

## 2022-12-01 NOTE — PROGRESS NOTES
"Subjective   Lynda Nolen is a 37 y.o. female.   Chief Complaint   Patient presents with   • Hernia     NP abdominal hernia      BP (!) 172/107 (BP Location: Right arm, Patient Position: Sitting, Cuff Size: Adult)   Pulse 93   Temp 99.1 °F (37.3 °C) (Infrared)   Ht 154.9 cm (61\")   Wt 57.8 kg (127 lb 6.4 oz)   BMI 24.07 kg/m²     HISTORY OF PRESENT ILLNESS:  37-year-old lady with a history of multiple abdominal surgeries including cholecystectomy appendectomy the development of a epigastric incisional hernia repair Performed back in 2021 with primary closure and then an umbilical hernia repair performed in January of this year which was an open umbilical hernia repair with mesh.  She says that she was having some sharp discomfort in the location prior to surgery and since the operation things never really got much better.  She has sharp pain with different movements that is progressively been getting worse and last about 30 minutes occurs daily.  She still has some moderate discomfort at her upper incisional hernia which was going on 2 years ago.  She does deal with some GI issues has some nausea.  Is here for a evaluation from a new surgeon to evaluate her pain at her incision sites to evaluate for any evidence of recurrent hernia      Outpatient Encounter Medications as of 12/1/2022   Medication Sig Dispense Refill   • amitriptyline (ELAVIL) 50 MG tablet TAKE ONE (1) TABLET BY MOUTH EVERY NIGHT AT BEDTIME     • buprenorphine-naloxone (SUBOXONE) 8-2 MG per SL tablet DISSOLVE 1 TABLET UNDER THE TONGUE TID  0   • Cholecalciferol (Vitamin D3) 50 MCG (2000 UT) capsule TAKE ONE (1) CAPSULE BY MOUTH EVERY MORNING     • dexmethylphenidate XR (FOCALIN XR) 20 MG 24 hr capsule TAKE ONE (1) CAPSULE BY MOUTH EVERY MORNING     • docusate sodium (COLACE) 100 MG capsule 1 capsule Every 12 (Twelve) Hours.     • DULoxetine (CYMBALTA) 60 MG capsule CYMBALTA 60 MG CPEP     • gabapentin (NEURONTIN) 800 MG tablet Take 800 " mg by mouth 4 (Four) Times a Day.     • lamoTRIgine (LaMICtal) 100 MG tablet 1 tablet Daily.     • nicotine (NICODERM CQ) 21 MG/24HR patch APPLY ONE (1) PATCH TOPICALLY EVERY DAY. REMOVE AT NIGHT. DO not smoke ON PATCH     • ondansetron (ZOFRAN) 4 MG tablet TAKE ONE (1) TABLET BY MOUTH EVERY DAY AS NEEDED. TAKE along WITH lamotrigine     • propranolol (INDERAL) 10 MG tablet Take 1 tablet by mouth. q 8 to 12 hours, prn     • SUMAtriptan (IMITREX) 100 MG tablet      • [DISCONTINUED] amitriptyline (ELAVIL) 10 MG tablet Daily.     • [DISCONTINUED] amLODIPine (NORVASC) 10 MG tablet AMLODIPINE BESYLATE 10 MG TABS     • [DISCONTINUED] doxazosin (CARDURA) 4 MG tablet Daily.     • [DISCONTINUED] famotidine (PEPCID) 40 MG tablet Take 40 mg by mouth Daily.     • [DISCONTINUED] lamoTRIgine (LaMICtal) 200 MG tablet      • [DISCONTINUED] methylphenidate (CONCERTA) 36 MG CR tablet Take 36 mg by mouth 2 (Two) Times a Day     • [DISCONTINUED] methylphenidate (CONCERTA) 54 MG CR tablet Take 54 mg by mouth Daily     • [DISCONTINUED] ramelteon (ROZEREM) 8 MG tablet ROZEREM 8 MG TABS     • [DISCONTINUED] tiZANidine (ZANAFLEX) 4 MG tablet Take 1 tablet by mouth Every 8 (Eight) Hours As Needed for Muscle Spasms. 90 tablet 11     No facility-administered encounter medications on file as of 12/1/2022.         The following portions of the patient's history were reviewed and updated as appropriate: allergies, current medications, past family history, past medical history, past social history, past surgical history and problem list.    Review of Systems  Positive for nausea no reported vomiting.  Positive for abdominal pain.  Positive for regular bowel function.  Positive for poor wound healing.  Objective     Physical Exam  Constitutional:       General: She is not in acute distress.     Appearance: She is ill-appearing.   HENT:      Head: Normocephalic and atraumatic.      Comments: Positive for dental pain  Cardiovascular:      Rate and  Rhythm: Normal rate.   Pulmonary:      Effort: Pulmonary effort is normal. No respiratory distress.   Abdominal:      General: There is no distension.      Palpations: Abdomen is soft.      Comments: Mild to moderate tenderness to palpation at the periumbilical region you can feel the implanted mesh vital feeling evidence of persistent or recurrent hernia.  There is no evidence of infection.  In the epigastrium there is a scar suggesting the previous port site and hernia repair without any definitive evidence of hernia recurrence   Skin:     General: Skin is warm and dry.   Neurological:      General: No focal deficit present.      Mental Status: Mental status is at baseline.   Psychiatric:         Mood and Affect: Mood normal.         Behavior: Behavior normal.           Assessment & Plan   Diagnoses and all orders for this visit:    1. Abdominal pain, generalized (Primary)    Pain at her umbilical hernia repair.  Feeling definitive evidence of hernia on exam.  We will go ahead and get a CT scan abdomen and pelvis to evaluate the abdominal wall and to see if there is any evidence of recurrence.  This will also help demonstrate the size of the mesh.  If there is evidence of recurrent hernia will  her about surgery if not I would have to talk to her about her chronic pain and make a decision about whether or not additional surgery would be of benefit.  She also needs to get her dental pain taken care of that she could have a tooth abscesses and she says that she is likely going to need to have some teeth pulled.  I certainly would want to have this happen before any new implanted mesh.  Follow-up in about 1 month    Rick Mccray MD  12/1/2022  2:11 PM EST    This note was created using Dragon Voice Recognition software.

## 2022-12-22 ENCOUNTER — APPOINTMENT (OUTPATIENT)
Dept: CT IMAGING | Facility: HOSPITAL | Age: 37
End: 2022-12-22

## 2023-03-08 ENCOUNTER — HOSPITAL ENCOUNTER (OUTPATIENT)
Dept: CT IMAGING | Facility: HOSPITAL | Age: 38
Discharge: HOME | End: 2023-03-08
Admitting: SURGERY
Payer: MEDICAID

## 2023-03-08 DIAGNOSIS — R10.84 ABDOMINAL PAIN, GENERALIZED: ICD-10-CM

## 2023-03-08 LAB
CREAT BLDA-MCNC: 0.9 MG/DL (ref 0.6–1.3)
EGFRCR SERPLBLD CKD-EPI 2021: 83.6 ML/MIN/1.73

## 2023-03-08 PROCEDURE — 25510000001 IOPAMIDOL PER 1 ML: Performed by: SURGERY

## 2023-03-08 PROCEDURE — 82565 ASSAY OF CREATININE: CPT

## 2023-03-08 PROCEDURE — 74177 CT ABD & PELVIS W/CONTRAST: CPT

## 2023-03-08 RX ADMIN — IOPAMIDOL 100 ML: 755 INJECTION, SOLUTION INTRAVENOUS at 17:11

## 2023-06-29 NOTE — PAT
Call out to Dr. Blackwell offfice regarding WBC=12.70.  Waiting on response.  Surgery is 2/25/2020.  
Secure chat sent to Dr. Blackwell and Le Calixto on WBC=12.70.  Advise for further testing.  
Patient with one or more new problems requiring additional work-up/treatment.

## 2024-01-08 ENCOUNTER — TRANSCRIBE ORDERS (OUTPATIENT)
Dept: ADMINISTRATIVE | Facility: HOSPITAL | Age: 39
End: 2024-01-08
Payer: MEDICAID

## 2024-01-08 DIAGNOSIS — Z12.31 SCREENING MAMMOGRAM, ENCOUNTER FOR: Primary | ICD-10-CM

## 2025-01-27 ENCOUNTER — OFFICE VISIT (OUTPATIENT)
Dept: SURGERY | Facility: CLINIC | Age: 40
End: 2025-01-27
Payer: MEDICAID

## 2025-01-27 VITALS
TEMPERATURE: 100.2 F | HEIGHT: 61 IN | HEART RATE: 78 BPM | OXYGEN SATURATION: 99 % | BODY MASS INDEX: 20.97 KG/M2 | DIASTOLIC BLOOD PRESSURE: 90 MMHG | WEIGHT: 111.1 LBS | SYSTOLIC BLOOD PRESSURE: 152 MMHG

## 2025-01-27 DIAGNOSIS — R10.33 PERIUMBILICAL ABDOMINAL PAIN: Primary | ICD-10-CM

## 2025-01-27 NOTE — PROGRESS NOTES
General Surgery History and Physical      Referring Provider: No ref. provider found    Chief Complaint:    History of ventral hernia -prior history of open umbilical hernia repair with mesh and primary epigastric hernia repair    History of Present Illness  The patient is a 39-year-old female here for evaluation of a possible ventral hernia.    She has expressed concerns about a potential recurrence of her umbilical hernia, citing several problematic areas around her navel. She reports experiencing swelling, which she describes as more severe than the pre-surgical state. The swelling is associated with pain and a sensation of something protruding, accompanied by sharp, pinching discomfort. She also notes that the area above her navel, where the stitches were placed, is causing her distress.  The pain intensifies during meals, leading to a noticeable protrusion, and has resulted in a significant reduction in her food intake. She has a history of umbilical hernia repair with mesh and a repair of a smaller hernia located above the umbilicus, which was closed primarily.     Past Medical History:   Past Medical History:   Diagnosis Date    Anxiety     Arthritis     Chronic diarrhea     Chronic pain     Depression     Fibromyalgia     GERD (gastroesophageal reflux disease)     History of bipolar disorder     History of goiter 01/06/2016    History of illicit drug use     History of suicide attempt     Hypertension 2013    Injury of back     Injury of neck     Low back pain     Migraines     MARY GRACE (obstructive sleep apnea)     non compliance with bipap    Shortness of breath     Substance abuse     Vitamin D deficiency       Past Surgical History:    Past Surgical History:   Procedure Laterality Date    ANTERIOR CERVICAL DISCECTOMY W/ FUSION N/A 02/25/2020    Procedure: ANTERIOR CERVICAL DISCECTOMY AND FUSION CERVICAL FIVE THROUGH CERVICAL SIX;  Surgeon: Magno Blackwell MD;  Location: [facility] OR;  Service:  Neurosurgery;  Laterality: N/A;    APPENDECTOMY  2002    CARDIAC CATHETERIZATION      [facility] 12/16    CERVICAL FUSION      CHOLECYSTECTOMY  2012    DILATATION AND CURETTAGE  2001    HERNIA REPAIR  02/2021    incisional hernia repair    OTHER SURGICAL HISTORY      FUSION BACK SURGERY (2014)    TONSILLECTOMY      UMBILICAL HERNIA REPAIR  01/2022     Family History:    Family History   Problem Relation Age of Onset    Cancer Father     Prostate cancer Father     Thyroid disease Sister     Melanoma Sister     Heart disease Brother         MI age 35    Diabetes Paternal Grandmother      Social History:    Social History     Socioeconomic History    Marital status: Single   Tobacco Use    Smoking status: Every Day     Current packs/day: 0.50     Types: Cigarettes    Smokeless tobacco: Never   Vaping Use    Vaping status: Former   Substance and Sexual Activity    Alcohol use: No    Drug use: No    Sexual activity: Defer     Allergies:   No Known Allergies    Medications:     Current Outpatient Medications:     amitriptyline (ELAVIL) 50 MG tablet, TAKE ONE (1) TABLET BY MOUTH EVERY NIGHT AT BEDTIME, Disp: , Rfl:     buprenorphine-naloxone (SUBOXONE) 8-2 MG per SL tablet, DISSOLVE 1 TABLET UNDER THE TONGUE TID, Disp: , Rfl: 0    Cholecalciferol (Vitamin D3) 50 MCG (2000 UT) capsule, TAKE ONE (1) CAPSULE BY MOUTH EVERY MORNING, Disp: , Rfl:     dexmethylphenidate XR (FOCALIN XR) 20 MG 24 hr capsule, TAKE ONE (1) CAPSULE BY MOUTH EVERY MORNING, Disp: , Rfl:     docusate sodium (COLACE) 100 MG capsule, 1 capsule Every 12 (Twelve) Hours., Disp: , Rfl:     DULoxetine (CYMBALTA) 60 MG capsule, CYMBALTA 60 MG CPEP, Disp: , Rfl:     gabapentin (NEURONTIN) 800 MG tablet, Take 800 mg by mouth 4 (Four) Times a Day., Disp: , Rfl:     lamoTRIgine (LaMICtal) 100 MG tablet, 1 tablet Daily., Disp: , Rfl:     nicotine (NICODERM CQ) 21 MG/24HR patch, APPLY ONE (1) PATCH TOPICALLY EVERY DAY. REMOVE AT NIGHT. DO not smoke ON PATCH, Disp: , Rfl:      ondansetron (ZOFRAN) 4 MG tablet, TAKE ONE (1) TABLET BY MOUTH EVERY DAY AS NEEDED. TAKE along WITH lamotrigine, Disp: , Rfl:     propranolol (INDERAL) 10 MG tablet, Take 1 tablet by mouth. q 8 to 12 hours, prn, Disp: , Rfl:     SUMAtriptan (IMITREX) 100 MG tablet, , Disp: , Rfl:     Radiology/Endoscopy:    CT abdomen/pelvis 3/8/2023  Impression:  1.No acute process identified within abdomen/pelvis.  2.Ventral hernia repair appears intact.    Labs:    None recent within our system    Review of Systems:   As noted above in HPI    Physical Exam:   No acute distress, alert  Nonlabored respirations  Abdomen soft, nondistended, no obvious evidence of hernia recurrence but can palpabe suture in the epigastrium and the mesh with some tenderness on exam  Extremities warm and well-perfused with no gross deformities    Assessment & Plan  39 year old female with abdominal pain and prior hernia repair.  Without obvious hernia on exam.    -Discussed with pt last CT approximately 2 years ago did not show hernia recurrence but patient insisting on recurrent and intermittent bulging so will repeat CT scan  -Patient to followup after CT scan to review results; if no hernia seen may potentially discuss mesh excision and repair but risk of infection and recurrent with this    Karol Carter MD  General Surgery    Patient or patient representative verbalized consent for the use of Ambient Listening during the visit with  Karol Carter MD for chart documentation. 1/27/2025  21:24 EST

## 2025-02-10 ENCOUNTER — HOSPITAL ENCOUNTER (OUTPATIENT)
Dept: PET IMAGING | Facility: HOSPITAL | Age: 40
Discharge: HOME | End: 2025-02-10
Admitting: SURGERY
Payer: MEDICAID

## 2025-02-10 DIAGNOSIS — R10.33 PERIUMBILICAL ABDOMINAL PAIN: ICD-10-CM

## 2025-02-10 PROCEDURE — 74176 CT ABD & PELVIS W/O CONTRAST: CPT

## 2025-02-20 ENCOUNTER — TELEPHONE (OUTPATIENT)
Dept: SURGERY | Facility: CLINIC | Age: 40
End: 2025-02-20
Payer: MEDICAID

## 2025-02-20 NOTE — TELEPHONE ENCOUNTER
Call placed to patient regarding No Show appt on 2/17/2025, patient reports her car broke down on the way in. Appt re-scheduled for 2/24/2025; patient will call back if any issue with transportation.

## 2025-02-24 ENCOUNTER — OFFICE VISIT (OUTPATIENT)
Dept: SURGERY | Facility: CLINIC | Age: 40
End: 2025-02-24
Payer: MEDICAID

## 2025-02-24 VITALS
WEIGHT: 113.5 LBS | HEIGHT: 61 IN | HEART RATE: 74 BPM | SYSTOLIC BLOOD PRESSURE: 125 MMHG | TEMPERATURE: 98 F | BODY MASS INDEX: 21.43 KG/M2 | DIASTOLIC BLOOD PRESSURE: 74 MMHG | OXYGEN SATURATION: 100 %

## 2025-02-24 DIAGNOSIS — K43.2 INCISIONAL HERNIA, WITHOUT OBSTRUCTION OR GANGRENE: Primary | ICD-10-CM

## 2025-02-24 RX ORDER — SODIUM CHLORIDE 9 MG/ML
100 INJECTION, SOLUTION INTRAVENOUS CONTINUOUS
OUTPATIENT
Start: 2025-02-24 | End: 2025-02-25

## 2025-02-24 RX ORDER — SODIUM CHLORIDE 0.9 % (FLUSH) 0.9 %
3 SYRINGE (ML) INJECTION EVERY 12 HOURS SCHEDULED
OUTPATIENT
Start: 2025-02-24

## 2025-02-24 RX ORDER — SODIUM CHLORIDE 0.9 % (FLUSH) 0.9 %
3-10 SYRINGE (ML) INJECTION AS NEEDED
OUTPATIENT
Start: 2025-02-24

## 2025-02-24 RX ORDER — SODIUM CHLORIDE 9 MG/ML
40 INJECTION, SOLUTION INTRAVENOUS AS NEEDED
OUTPATIENT
Start: 2025-02-24

## 2025-02-25 NOTE — PROGRESS NOTES
General Surgery History and Physical      Referring Provider: No ref. provider found    Chief Complaint:    Pain at prior umbilical hernia repair site    History of Present Illness:    Lynda Nolen is a 39 y.o. previously seen in the clinic for pain at the prior umbilical hernia site.  At that time I did not appreciate a hernia recurrence however the patient was insistent on getting repeat imaging.  We did subsequently get a repeat CT scan and the umbilical hernia repair is indeed intact.  The patient reports her pain around the umbilicus has improved.  She does complain of pain at the epigastric hernia site which was primarily repaired.  There is evidence of recurrence of this hernia on imaging.    Past Medical History:   Past Medical History:   Diagnosis Date    Anxiety     Arthritis     Chronic diarrhea     Chronic pain     Depression     Fibromyalgia     GERD (gastroesophageal reflux disease)     History of bipolar disorder     History of goiter 01/06/2016    History of illicit drug use     History of suicide attempt     Hypertension 2013    Injury of back     Injury of neck     Low back pain     Migraines     MARY GRACE (obstructive sleep apnea)     non compliance with bipap    Shortness of breath     Substance abuse     Vitamin D deficiency       Past Surgical History:    Past Surgical History:   Procedure Laterality Date    ANTERIOR CERVICAL DISCECTOMY W/ FUSION N/A 02/25/2020    Procedure: ANTERIOR CERVICAL DISCECTOMY AND FUSION CERVICAL FIVE THROUGH CERVICAL SIX;  Surgeon: Magno Blackwell MD;  Location: [facility] MAIN OR;  Service: Neurosurgery;  Laterality: N/A;    APPENDECTOMY  2002    CARDIAC CATHETERIZATION      [facility] 12/16    CERVICAL FUSION      CHOLECYSTECTOMY  2012    DILATATION AND CURETTAGE  2001    HERNIA REPAIR  02/2021    incisional hernia repair    OTHER SURGICAL HISTORY      FUSION BACK SURGERY (2014)    TONSILLECTOMY      UMBILICAL HERNIA REPAIR  01/2022     Family History:    Family History    Problem Relation Age of Onset    Cancer Father     Prostate cancer Father     Thyroid disease Sister     Melanoma Sister     Heart disease Brother         MI age 35    Diabetes Paternal Grandmother      Social History:    Social History     Socioeconomic History    Marital status: Single   Tobacco Use    Smoking status: Every Day     Current packs/day: 0.50     Types: Cigarettes     Passive exposure: Current    Smokeless tobacco: Never   Vaping Use    Vaping status: Former   Substance and Sexual Activity    Alcohol use: No    Drug use: No    Sexual activity: Defer     Allergies:   No Known Allergies    Medications:   No current outpatient medications on file.    Radiology/Endoscopy:    CT abdomen/pelvis 2/10/2025  Impression:  1.Stable postsurgical changes from umbilical/supraumbilical hernia repair with mesh. No evidence of recurrent hernia.  2.Stable tiny fat-containing right para midline ventral hernia in the epigastric region.  3.2.3 cm right adnexal cyst.    Labs:    None recent within our system    Review of Systems:   As noted above in HPI    Physical Exam:   No acute distress, alert  Nonlabored respirations  Abdomen soft, nontender, nondistended, small reducible epigastric hernia under prior laparoscopic port site  Extremities warm and well-perfused with no gross deformities    Assessment and Plan:  Lynda Nolen is a 39 y.o. with recurrence of epigastric hernia at prior laparoscopic port site.    - We discussed given the size my recommendation would be primary repair  - Procedure along with associate risk, benefits, terms were discussed; risk discussed include but are not limited to bleeding, infection, hernia recurrence  - Patient expressed understanding and would like to proceed with repair of the epigastric hernia    Karol Carter MD  General Surgery

## 2025-03-08 ENCOUNTER — HOSPITAL ENCOUNTER (EMERGENCY)
Facility: HOSPITAL | Age: 40
Discharge: HOME | End: 2025-03-08
Attending: EMERGENCY MEDICINE
Payer: MEDICAID

## 2025-03-08 VITALS
SYSTOLIC BLOOD PRESSURE: 146 MMHG | HEIGHT: 61 IN | RESPIRATION RATE: 16 BRPM | DIASTOLIC BLOOD PRESSURE: 92 MMHG | BODY MASS INDEX: 20.47 KG/M2 | OXYGEN SATURATION: 98 % | HEART RATE: 70 BPM | WEIGHT: 108.4 LBS | TEMPERATURE: 97.3 F

## 2025-03-08 DIAGNOSIS — N75.1 ABSCESS OF BARTHOLIN'S GLAND: Primary | ICD-10-CM

## 2025-03-08 PROCEDURE — 25010000002 LIDOCAINE 1% - EPINEPHRINE 1:100000 1 %-1:100000 SOLUTION: Performed by: EMERGENCY MEDICINE

## 2025-03-08 PROCEDURE — 99283 EMERGENCY DEPT VISIT LOW MDM: CPT

## 2025-03-08 RX ORDER — TRAMADOL HYDROCHLORIDE 50 MG/1
50 TABLET ORAL ONCE
Status: COMPLETED | OUTPATIENT
Start: 2025-03-08 | End: 2025-03-08

## 2025-03-08 RX ORDER — NAPROXEN 500 MG/1
500 TABLET ORAL 2 TIMES DAILY WITH MEALS
Qty: 20 TABLET | Refills: 0 | Status: SHIPPED | OUTPATIENT
Start: 2025-03-08

## 2025-03-08 RX ORDER — LIDOCAINE HYDROCHLORIDE AND EPINEPHRINE 10; 10 MG/ML; UG/ML
10 INJECTION, SOLUTION INFILTRATION; PERINEURAL ONCE
Status: COMPLETED | OUTPATIENT
Start: 2025-03-08 | End: 2025-03-08

## 2025-03-08 RX ADMIN — TRAMADOL HYDROCHLORIDE 50 MG: 50 TABLET ORAL at 15:20

## 2025-03-08 RX ADMIN — LIDOCAINE HYDROCHLORIDE,EPINEPHRINE BITARTRATE 10 ML: 10; .01 INJECTION, SOLUTION INFILTRATION; PERINEURAL at 15:20

## 2025-03-08 NOTE — FSED PROVIDER NOTE
Subjective   History of Present Illness  39-year-old female presents with 2 to 3-day history of right vulvar swelling consistent with previous Bartholin cyst she has had in the past.  No vaginal discharge, no fevers or chills, no other somatic complaints at this time.        Review of Systems   All other systems reviewed and are negative.      Past Medical History:   Diagnosis Date    Anxiety     Arthritis     Chronic diarrhea     Chronic pain     Depression     Fibromyalgia     GERD (gastroesophageal reflux disease)     History of bipolar disorder     History of goiter 01/06/2016    History of illicit drug use     History of suicide attempt     Hypertension 2013    Injury of back     Injury of neck     Low back pain     Migraines     MARY GRACE (obstructive sleep apnea)     non compliance with bipap    Shortness of breath     Substance abuse     Vitamin D deficiency        No Known Allergies    Past Surgical History:   Procedure Laterality Date    ANTERIOR CERVICAL DISCECTOMY W/ FUSION N/A 02/25/2020    Procedure: ANTERIOR CERVICAL DISCECTOMY AND FUSION CERVICAL FIVE THROUGH CERVICAL SIX;  Surgeon: Magno Blackwell MD;  Location: [facility] OR;  Service: Neurosurgery;  Laterality: N/A;    APPENDECTOMY  2002    CARDIAC CATHETERIZATION      [facility] 12/16    CERVICAL FUSION      CHOLECYSTECTOMY  2012    DILATATION AND CURETTAGE  2001    HERNIA REPAIR  02/2021    incisional hernia repair    OTHER SURGICAL HISTORY      FUSION BACK SURGERY (2014)    TONSILLECTOMY      UMBILICAL HERNIA REPAIR  01/2022       Family History   Problem Relation Age of Onset    Cancer Father     Prostate cancer Father     Thyroid disease Sister     Melanoma Sister     Heart disease Brother         MI age 35    Diabetes Paternal Grandmother        Social History     Socioeconomic History    Marital status: Single   Tobacco Use    Smoking status: Every Day     Current packs/day: 0.50     Types: Cigarettes     Passive exposure: Current    Smokeless  tobacco: Never   Vaping Use    Vaping status: Former   Substance and Sexual Activity    Alcohol use: No    Drug use: No    Sexual activity: Defer           Objective   Physical Exam  Vitals and nursing note reviewed.   Constitutional:       Appearance: Normal appearance.   HENT:      Head: Normocephalic and atraumatic.      Nose: Nose normal.      Mouth/Throat:      Mouth: Mucous membranes are moist.      Pharynx: Oropharynx is clear.   Eyes:      Extraocular Movements: Extraocular movements intact.   Pulmonary:      Effort: Pulmonary effort is normal. No respiratory distress.   Genitourinary:     Labia:         Right: Tenderness present. No lesion.           Comments: Right fluctuant Bartholin cyst, minimally tender to palpation  Musculoskeletal:         General: Normal range of motion.      Cervical back: Normal range of motion and neck supple.   Skin:     General: Skin is warm and dry.   Neurological:      General: No focal deficit present.      Mental Status: She is alert and oriented to person, place, and time.   Psychiatric:         Mood and Affect: Mood normal.         Behavior: Behavior normal.         Incision & Drainage    Date/Time: 3/8/2025 2:56 PM    Performed by: Amaris Hernandez MD  Authorized by: Amaris Hernandez MD    Consent:     Consent obtained:  Verbal    Consent given by:  Patient    Risks discussed:  Pain and bleeding  Universal protocol:     Patient identity confirmed:  Verbally with patient  Location:     Type:  Bartholin cyst    Size:  2 x 4 cm  Pre-procedure details:     Procedure prep: Alcohol.  Anesthesia:     Anesthesia method:  Local infiltration    Local anesthetic:  Lidocaine 1% WITH epi  Procedure type:     Complexity:  Simple  Procedure details:     Incision depth:  Dermal    Drainage:  Purulent    Drainage amount:  Moderate    Wound treatment:  Drain placed    Packing materials:  Word catheter  Post-procedure details:     Procedure completion:  Tolerated well, no immediate  complications             ED Course    Medical Decision Making  Multiple differential diagnoses were considered including but not limited to Bartholin's abscess, Bartholin cyst, I doubt mass or STD.     Patient is nontoxic afebrile, tolerating p.o. and in no distress.  Patient is stable to follow-up with outpatient gynecologist for recheck      Problems Addressed:  Abscess of Bartholin's gland: complicated acute illness or injury    Risk  Prescription drug management.        Final diagnoses:   Abscess of Bartholin's gland       ED Disposition  ED Disposition       ED Disposition   Discharge    Condition   Stable    Comment   --               William Rao MD  [facility] [address] [place] [address] [phone]    Schedule an appointment as soon as possible for a visit in 2 days  For wound re-check         Medication List        New Prescriptions      naproxen 500 MG EC tablet  Commonly known as: EC NAPROSYN  Take 1 tablet by mouth 2 (Two) Times a Day With Meals.               Where to Get Your Medications        These medications were sent to [facility]/pharmacy #[id number] - [place], [place] - [address] [place] [address] - [phone]  - [phone] [address] [place] [address]      Hours: 24-hours Phone: [phone]   naproxen 500 MG EC tablet

## 2025-03-08 NOTE — Clinical Note
[facility] FSED [place] [id number] [place] [address] [place] [address]  Phone: [phone]    Lynda Nolen was seen and treated in our emergency department on 3/8/2025.  She may return to work on 03/10/2025.         Thank you for choosing [facility].    Amaris Hernandez MD

## 2025-04-02 ENCOUNTER — ANESTHESIA EVENT (OUTPATIENT)
Dept: PERIOP | Facility: HOSPITAL | Age: 40
End: 2025-04-02
Payer: MEDICAID

## 2025-04-02 NOTE — ANESTHESIA PREPROCEDURE EVALUATION
Anesthesia Evaluation     Patient summary reviewed and Nursing notes reviewed   NPO Solid Status: > 8 hours  NPO Liquid Status: > 8 hours           Airway   Mallampati: I  TM distance: >3 FB  Neck ROM: full  No difficulty expected  Dental - normal exam   (+) edentulous    Pulmonary - normal exam   (+) ,shortness of breath, recent URI, sleep apnea  Cardiovascular - normal exam    (+) hypertension      Neuro/Psych  (+) headaches, dizziness/light headedness, numbness, psychiatric history  GI/Hepatic/Renal/Endo    (+) GERD, thyroid problem     ROS Comment: hank Nolen is a 39 y.o. with recurrence of epigastric hernia at prior laparoscopic port site.     - We discussed given the size my recommendation would be primary repair  - Procedure along with associate risk, benefits, terms were discussed; risk      Musculoskeletal     (+) myalgias  Abdominal  - normal exam    Bowel sounds: normal.   Substance History      OB/GYN    (+) Pregnant        Other   arthritis,       Other Comment: History Comments History Comments  History of bipolar disorder  History of goiter   Hypertension  Chronic pain   Low back pain  Vitamin D deficiency   GERD (gastroesophageal reflux disease)  History of illicit drug use   History of suicide attempt  MARY GRACE (obstructive sleep apnea) non compliance with bipap  Anxiety  Depression   Arthritis  Fibromyalgia   Migraines  Injury of back   Chronic diarrhea  Injury of neck   Shortness of breath  Substance abuse     Surgical History  Current as of 04/02/25 1626  APPENDECTOMY CHOLECYSTECTOMY  OTHER SURGICAL HISTORY DILATATION AND CURETTAGE  CARDIAC CATHETERIZATION TONSILLECTOMY  CERVICAL FUSION ANTERIOR CERVICAL DISCECTOMY W/ FUSION  HERNIA REPAIR UMBILICAL HERNIA REPAIR      ROS/Med Hx Other: BARTHOLIN CYST 3/2025 ER VISIT ANY RX?  ACDF 2/2020 GLIDE 3 GRI  TLIF 2014 MILL E GRI  ?SUBSTANCE ABUSE?                  Anesthesia Plan    ASA 2     general   total IV anesthesia  intravenous induction      Anesthetic plan, risks, benefits, and alternatives have been provided, discussed and informed consent has been obtained with: patient.    Plan discussed with CRNA.    CODE STATUS:

## 2025-04-03 ENCOUNTER — ANESTHESIA (OUTPATIENT)
Dept: PERIOP | Facility: HOSPITAL | Age: 40
End: 2025-04-03
Payer: MEDICAID

## 2025-04-08 ENCOUNTER — HOSPITAL ENCOUNTER (OUTPATIENT)
Facility: HOSPITAL | Age: 40
Setting detail: HOSPITAL OUTPATIENT SURGERY
Discharge: HOME | End: 2025-04-08
Attending: SURGERY | Admitting: SURGERY
Payer: MEDICAID

## 2025-04-08 VITALS
WEIGHT: 107.2 LBS | TEMPERATURE: 97.8 F | BODY MASS INDEX: 20.24 KG/M2 | HEART RATE: 56 BPM | SYSTOLIC BLOOD PRESSURE: 123 MMHG | RESPIRATION RATE: 16 BRPM | OXYGEN SATURATION: 96 % | HEIGHT: 61 IN | DIASTOLIC BLOOD PRESSURE: 78 MMHG

## 2025-04-08 DIAGNOSIS — K43.2 INCISIONAL HERNIA, WITHOUT OBSTRUCTION OR GANGRENE: ICD-10-CM

## 2025-04-08 LAB
ANION GAP SERPL CALCULATED.3IONS-SCNC: 10.1 MMOL/L (ref 5–15)
B-HCG UR QL: NEGATIVE
BASOPHILS # BLD AUTO: 0.04 10*3/MM3 (ref 0–0.2)
BASOPHILS NFR BLD AUTO: 0.4 % (ref 0–1.5)
BUN SERPL-MCNC: 15 MG/DL (ref 6–20)
BUN/CREAT SERPL: 23.1 (ref 7–25)
CALCIUM SPEC-SCNC: 9.4 MG/DL (ref 8.6–10.5)
CHLORIDE SERPL-SCNC: 104 MMOL/L (ref 98–107)
CO2 SERPL-SCNC: 28.9 MMOL/L (ref 22–29)
CREAT SERPL-MCNC: 0.65 MG/DL (ref 0.57–1)
DEPRECATED RDW RBC AUTO: 44.1 FL (ref 37–54)
EGFRCR SERPLBLD CKD-EPI 2021: 113.6 ML/MIN/1.73
EOSINOPHIL # BLD AUTO: 0.12 10*3/MM3 (ref 0–0.4)
EOSINOPHIL NFR BLD AUTO: 1.1 % (ref 0.3–6.2)
ERYTHROCYTE [DISTWIDTH] IN BLOOD BY AUTOMATED COUNT: 13 % (ref 12.3–15.4)
GLUCOSE SERPL-MCNC: 92 MG/DL (ref 65–99)
HCT VFR BLD AUTO: 35.8 % (ref 34–46.6)
HGB BLD-MCNC: 11.6 G/DL (ref 12–15.9)
IMM GRANULOCYTES # BLD AUTO: 0.03 10*3/MM3 (ref 0–0.05)
IMM GRANULOCYTES NFR BLD AUTO: 0.3 % (ref 0–0.5)
LYMPHOCYTES # BLD AUTO: 1.93 10*3/MM3 (ref 0.7–3.1)
LYMPHOCYTES NFR BLD AUTO: 17.9 % (ref 19.6–45.3)
MCH RBC QN AUTO: 30.1 PG (ref 26.6–33)
MCHC RBC AUTO-ENTMCNC: 32.4 G/DL (ref 31.5–35.7)
MCV RBC AUTO: 92.7 FL (ref 79–97)
MONOCYTES # BLD AUTO: 0.65 10*3/MM3 (ref 0.1–0.9)
MONOCYTES NFR BLD AUTO: 6 % (ref 5–12)
NEUTROPHILS NFR BLD AUTO: 74.3 % (ref 42.7–76)
NEUTROPHILS NFR BLD AUTO: 8.02 10*3/MM3 (ref 1.7–7)
NRBC BLD AUTO-RTO: 0 /100 WBC (ref 0–0.2)
PLATELET # BLD AUTO: 266 10*3/MM3 (ref 140–450)
PMV BLD AUTO: 10.3 FL (ref 6–12)
POTASSIUM SERPL-SCNC: 3.4 MMOL/L (ref 3.5–5.2)
RBC # BLD AUTO: 3.86 10*6/MM3 (ref 3.77–5.28)
SODIUM SERPL-SCNC: 143 MMOL/L (ref 136–145)
WBC NRBC COR # BLD AUTO: 10.79 10*3/MM3 (ref 3.4–10.8)

## 2025-04-08 PROCEDURE — 25010000002 SUGAMMADEX 200 MG/2ML SOLUTION: Performed by: NURSE ANESTHETIST, CERTIFIED REGISTERED

## 2025-04-08 PROCEDURE — 25010000002 DEXAMETHASONE PER 1 MG: Performed by: NURSE ANESTHETIST, CERTIFIED REGISTERED

## 2025-04-08 PROCEDURE — 25010000002 FENTANYL CITRATE (PF) 50 MCG/ML SOLUTION: Performed by: NURSE ANESTHETIST, CERTIFIED REGISTERED

## 2025-04-08 PROCEDURE — 25010000002 ONDANSETRON PER 1 MG: Performed by: NURSE ANESTHETIST, CERTIFIED REGISTERED

## 2025-04-08 PROCEDURE — 25010000002 PROPOFOL 1000 MG/100ML EMULSION: Performed by: NURSE ANESTHETIST, CERTIFIED REGISTERED

## 2025-04-08 PROCEDURE — 80048 BASIC METABOLIC PNL TOTAL CA: CPT | Performed by: SURGERY

## 2025-04-08 PROCEDURE — 25010000002 BUPIVACAINE (PF) 0.25 % SOLUTION: Performed by: SURGERY

## 2025-04-08 PROCEDURE — 25810000003 LACTATED RINGERS PER 1000 ML: Performed by: SURGERY

## 2025-04-08 PROCEDURE — 25010000002 LIDOCAINE PF 2% 2 % SOLUTION: Performed by: NURSE ANESTHETIST, CERTIFIED REGISTERED

## 2025-04-08 PROCEDURE — 25010000002 HYDROMORPHONE 1 MG/ML SOLUTION: Performed by: NURSE ANESTHETIST, CERTIFIED REGISTERED

## 2025-04-08 PROCEDURE — 25810000003 LACTATED RINGERS PER 1000 ML: Performed by: NURSE ANESTHETIST, CERTIFIED REGISTERED

## 2025-04-08 PROCEDURE — 85025 COMPLETE CBC W/AUTO DIFF WBC: CPT | Performed by: SURGERY

## 2025-04-08 PROCEDURE — 81025 URINE PREGNANCY TEST: CPT | Performed by: SURGERY

## 2025-04-08 PROCEDURE — 25010000002 MIDAZOLAM PER 1 MG: Performed by: NURSE ANESTHETIST, CERTIFIED REGISTERED

## 2025-04-08 PROCEDURE — 25010000002 CEFAZOLIN PER 500 MG: Performed by: SURGERY

## 2025-04-08 RX ORDER — DEXAMETHASONE SODIUM PHOSPHATE 4 MG/ML
INJECTION, SOLUTION INTRA-ARTICULAR; INTRALESIONAL; INTRAMUSCULAR; INTRAVENOUS; SOFT TISSUE AS NEEDED
Status: DISCONTINUED | OUTPATIENT
Start: 2025-04-08 | End: 2025-04-08 | Stop reason: SURG

## 2025-04-08 RX ORDER — DIPHENHYDRAMINE HYDROCHLORIDE 50 MG/ML
12.5 INJECTION, SOLUTION INTRAMUSCULAR; INTRAVENOUS ONCE AS NEEDED
Status: DISCONTINUED | OUTPATIENT
Start: 2025-04-08 | End: 2025-04-08 | Stop reason: HOSPADM

## 2025-04-08 RX ORDER — ACETAMINOPHEN 500 MG
1000 TABLET ORAL ONCE
Status: COMPLETED | OUTPATIENT
Start: 2025-04-08 | End: 2025-04-08

## 2025-04-08 RX ORDER — FENTANYL CITRATE 50 UG/ML
50 INJECTION, SOLUTION INTRAMUSCULAR; INTRAVENOUS
Status: DISCONTINUED | OUTPATIENT
Start: 2025-04-08 | End: 2025-04-08 | Stop reason: HOSPADM

## 2025-04-08 RX ORDER — SODIUM CHLORIDE 0.9 % (FLUSH) 0.9 %
3-10 SYRINGE (ML) INJECTION AS NEEDED
Status: DISCONTINUED | OUTPATIENT
Start: 2025-04-08 | End: 2025-04-08 | Stop reason: HOSPADM

## 2025-04-08 RX ORDER — SODIUM CHLORIDE, SODIUM LACTATE, POTASSIUM CHLORIDE, CALCIUM CHLORIDE 600; 310; 30; 20 MG/100ML; MG/100ML; MG/100ML; MG/100ML
1000 INJECTION, SOLUTION INTRAVENOUS ONCE
Status: DISCONTINUED | OUTPATIENT
Start: 2025-04-08 | End: 2025-04-08 | Stop reason: HOSPADM

## 2025-04-08 RX ORDER — SODIUM CHLORIDE 0.9 % (FLUSH) 0.9 %
10 SYRINGE (ML) INJECTION AS NEEDED
Status: DISCONTINUED | OUTPATIENT
Start: 2025-04-08 | End: 2025-04-08 | Stop reason: HOSPADM

## 2025-04-08 RX ORDER — SODIUM CHLORIDE 0.9 % (FLUSH) 0.9 %
10 SYRINGE (ML) INJECTION EVERY 12 HOURS SCHEDULED
Status: DISCONTINUED | OUTPATIENT
Start: 2025-04-08 | End: 2025-04-08 | Stop reason: HOSPADM

## 2025-04-08 RX ORDER — LABETALOL HYDROCHLORIDE 5 MG/ML
5 INJECTION, SOLUTION INTRAVENOUS
Status: DISCONTINUED | OUTPATIENT
Start: 2025-04-08 | End: 2025-04-08 | Stop reason: HOSPADM

## 2025-04-08 RX ORDER — LIDOCAINE HYDROCHLORIDE 10 MG/ML
0.5 INJECTION, SOLUTION EPIDURAL; INFILTRATION; INTRACAUDAL; PERINEURAL ONCE AS NEEDED
Status: DISCONTINUED | OUTPATIENT
Start: 2025-04-08 | End: 2025-04-08 | Stop reason: HOSPADM

## 2025-04-08 RX ORDER — HYDROCODONE BITARTRATE AND ACETAMINOPHEN 5; 325 MG/1; MG/1
1 TABLET ORAL EVERY 6 HOURS PRN
Qty: 15 TABLET | Refills: 0 | Status: SHIPPED | OUTPATIENT
Start: 2025-04-08

## 2025-04-08 RX ORDER — PROPOFOL 10 MG/ML
INJECTION, EMULSION INTRAVENOUS AS NEEDED
Status: DISCONTINUED | OUTPATIENT
Start: 2025-04-08 | End: 2025-04-08 | Stop reason: SURG

## 2025-04-08 RX ORDER — IPRATROPIUM BROMIDE AND ALBUTEROL SULFATE 2.5; .5 MG/3ML; MG/3ML
3 SOLUTION RESPIRATORY (INHALATION) ONCE AS NEEDED
Status: DISCONTINUED | OUTPATIENT
Start: 2025-04-08 | End: 2025-04-08 | Stop reason: HOSPADM

## 2025-04-08 RX ORDER — DIPHENHYDRAMINE HYDROCHLORIDE 50 MG/ML
12.5 INJECTION, SOLUTION INTRAMUSCULAR; INTRAVENOUS
Status: DISCONTINUED | OUTPATIENT
Start: 2025-04-08 | End: 2025-04-08 | Stop reason: HOSPADM

## 2025-04-08 RX ORDER — MIDAZOLAM HYDROCHLORIDE 1 MG/ML
INJECTION, SOLUTION INTRAMUSCULAR; INTRAVENOUS AS NEEDED
Status: DISCONTINUED | OUTPATIENT
Start: 2025-04-08 | End: 2025-04-08 | Stop reason: SURG

## 2025-04-08 RX ORDER — SODIUM CHLORIDE 0.9 % (FLUSH) 0.9 %
3 SYRINGE (ML) INJECTION EVERY 12 HOURS SCHEDULED
Status: DISCONTINUED | OUTPATIENT
Start: 2025-04-08 | End: 2025-04-08 | Stop reason: HOSPADM

## 2025-04-08 RX ORDER — NALOXONE HCL 0.4 MG/ML
0.4 VIAL (ML) INJECTION AS NEEDED
Status: DISCONTINUED | OUTPATIENT
Start: 2025-04-08 | End: 2025-04-08 | Stop reason: HOSPADM

## 2025-04-08 RX ORDER — SODIUM CHLORIDE 9 MG/ML
100 INJECTION, SOLUTION INTRAVENOUS CONTINUOUS
Status: DISCONTINUED | OUTPATIENT
Start: 2025-04-08 | End: 2025-04-08 | Stop reason: HOSPADM

## 2025-04-08 RX ORDER — OXYCODONE HYDROCHLORIDE 5 MG/1
10 TABLET ORAL EVERY 4 HOURS PRN
Refills: 0 | Status: COMPLETED | OUTPATIENT
Start: 2025-04-08 | End: 2025-04-08

## 2025-04-08 RX ORDER — OXYCODONE HYDROCHLORIDE 5 MG/1
5 TABLET ORAL ONCE AS NEEDED
Refills: 0 | Status: DISCONTINUED | OUTPATIENT
Start: 2025-04-08 | End: 2025-04-08 | Stop reason: HOSPADM

## 2025-04-08 RX ORDER — SODIUM CHLORIDE 9 MG/ML
40 INJECTION, SOLUTION INTRAVENOUS AS NEEDED
Status: DISCONTINUED | OUTPATIENT
Start: 2025-04-08 | End: 2025-04-08 | Stop reason: HOSPADM

## 2025-04-08 RX ORDER — ROCURONIUM BROMIDE 10 MG/ML
INJECTION, SOLUTION INTRAVENOUS AS NEEDED
Status: DISCONTINUED | OUTPATIENT
Start: 2025-04-08 | End: 2025-04-08 | Stop reason: SURG

## 2025-04-08 RX ORDER — SODIUM CHLORIDE, SODIUM LACTATE, POTASSIUM CHLORIDE, CALCIUM CHLORIDE 600; 310; 30; 20 MG/100ML; MG/100ML; MG/100ML; MG/100ML
1000 INJECTION, SOLUTION INTRAVENOUS ONCE
Status: COMPLETED | OUTPATIENT
Start: 2025-04-08 | End: 2025-04-08

## 2025-04-08 RX ORDER — SCOPOLAMINE 1 MG/3D
1 PATCH, EXTENDED RELEASE TRANSDERMAL ONCE
Status: DISCONTINUED | OUTPATIENT
Start: 2025-04-08 | End: 2025-04-08 | Stop reason: HOSPADM

## 2025-04-08 RX ORDER — OXYCODONE HYDROCHLORIDE 5 MG/1
10 TABLET ORAL ONCE
Status: COMPLETED | OUTPATIENT
Start: 2025-04-08 | End: 2025-04-08

## 2025-04-08 RX ORDER — SODIUM CHLORIDE, SODIUM LACTATE, POTASSIUM CHLORIDE, CALCIUM CHLORIDE 600; 310; 30; 20 MG/100ML; MG/100ML; MG/100ML; MG/100ML
INJECTION, SOLUTION INTRAVENOUS CONTINUOUS PRN
Status: DISCONTINUED | OUTPATIENT
Start: 2025-04-08 | End: 2025-04-08 | Stop reason: SURG

## 2025-04-08 RX ORDER — FLUMAZENIL 0.1 MG/ML
0.2 INJECTION INTRAVENOUS AS NEEDED
Status: DISCONTINUED | OUTPATIENT
Start: 2025-04-08 | End: 2025-04-08 | Stop reason: HOSPADM

## 2025-04-08 RX ORDER — EPHEDRINE SULFATE 5 MG/ML
5 INJECTION INTRAVENOUS ONCE AS NEEDED
Status: DISCONTINUED | OUTPATIENT
Start: 2025-04-08 | End: 2025-04-08 | Stop reason: HOSPADM

## 2025-04-08 RX ORDER — CELECOXIB 200 MG/1
200 CAPSULE ORAL ONCE
Status: COMPLETED | OUTPATIENT
Start: 2025-04-08 | End: 2025-04-08

## 2025-04-08 RX ORDER — BUPIVACAINE HYDROCHLORIDE 2.5 MG/ML
INJECTION, SOLUTION EPIDURAL; INFILTRATION; INTRACAUDAL; PERINEURAL AS NEEDED
Status: DISCONTINUED | OUTPATIENT
Start: 2025-04-08 | End: 2025-04-08 | Stop reason: HOSPADM

## 2025-04-08 RX ORDER — HYDRALAZINE HYDROCHLORIDE 20 MG/ML
5 INJECTION INTRAMUSCULAR; INTRAVENOUS
Status: DISCONTINUED | OUTPATIENT
Start: 2025-04-08 | End: 2025-04-08 | Stop reason: HOSPADM

## 2025-04-08 RX ORDER — ONDANSETRON 2 MG/ML
4 INJECTION INTRAMUSCULAR; INTRAVENOUS ONCE AS NEEDED
Status: COMPLETED | OUTPATIENT
Start: 2025-04-08 | End: 2025-04-08

## 2025-04-08 RX ORDER — LIDOCAINE HYDROCHLORIDE 20 MG/ML
INJECTION, SOLUTION EPIDURAL; INFILTRATION; INTRACAUDAL; PERINEURAL AS NEEDED
Status: DISCONTINUED | OUTPATIENT
Start: 2025-04-08 | End: 2025-04-08 | Stop reason: SURG

## 2025-04-08 RX ADMIN — OXYCODONE HYDROCHLORIDE 10 MG: 5 TABLET ORAL at 10:51

## 2025-04-08 RX ADMIN — DEXAMETHASONE SODIUM PHOSPHATE 8 MG: 4 INJECTION, SOLUTION INTRAMUSCULAR; INTRAVENOUS at 09:07

## 2025-04-08 RX ADMIN — LIDOCAINE HYDROCHLORIDE 50 MG: 20 INJECTION, SOLUTION EPIDURAL; INFILTRATION; INTRACAUDAL; PERINEURAL at 08:58

## 2025-04-08 RX ADMIN — CEFAZOLIN 2000 MG: 2 INJECTION, POWDER, FOR SOLUTION INTRAMUSCULAR; INTRAVENOUS at 08:47

## 2025-04-08 RX ADMIN — MIDAZOLAM 2 MG: 1 INJECTION INTRAMUSCULAR; INTRAVENOUS at 08:53

## 2025-04-08 RX ADMIN — FENTANYL CITRATE 50 MCG: 50 INJECTION, SOLUTION INTRAMUSCULAR; INTRAVENOUS at 08:58

## 2025-04-08 RX ADMIN — ACETAMINOPHEN 1000 MG: 500 TABLET, FILM COATED ORAL at 07:56

## 2025-04-08 RX ADMIN — OXYCODONE HYDROCHLORIDE 10 MG: 5 TABLET ORAL at 07:56

## 2025-04-08 RX ADMIN — FENTANYL CITRATE 50 MCG: 50 INJECTION, SOLUTION INTRAMUSCULAR; INTRAVENOUS at 09:16

## 2025-04-08 RX ADMIN — HYDROMORPHONE HYDROCHLORIDE 1 MG: 1 INJECTION, SOLUTION INTRAMUSCULAR; INTRAVENOUS; SUBCUTANEOUS at 10:21

## 2025-04-08 RX ADMIN — ONDANSETRON 8 MG: 2 INJECTION, SOLUTION INTRAMUSCULAR; INTRAVENOUS at 09:40

## 2025-04-08 RX ADMIN — ROCURONIUM BROMIDE 50 MG: 50 INJECTION INTRAVENOUS at 08:58

## 2025-04-08 RX ADMIN — PROPOFOL 170 MCG/KG/MIN: 10 INJECTION, EMULSION INTRAVENOUS at 09:00

## 2025-04-08 RX ADMIN — SODIUM CHLORIDE, SODIUM LACTATE, POTASSIUM CHLORIDE, AND CALCIUM CHLORIDE: .6; .31; .03; .02 INJECTION, SOLUTION INTRAVENOUS at 08:53

## 2025-04-08 RX ADMIN — SCOPOLAMINE 1 PATCH: 1.5 PATCH, EXTENDED RELEASE TRANSDERMAL at 07:56

## 2025-04-08 RX ADMIN — SUGAMMADEX 200 MG: 100 INJECTION, SOLUTION INTRAVENOUS at 09:42

## 2025-04-08 RX ADMIN — SODIUM CHLORIDE, SODIUM LACTATE, POTASSIUM CHLORIDE, CALCIUM CHLORIDE 1000 ML: 20; 30; 600; 310 INJECTION, SOLUTION INTRAVENOUS at 08:01

## 2025-04-08 RX ADMIN — PROPOFOL 200 MG: 10 INJECTION, EMULSION INTRAVENOUS at 08:58

## 2025-04-08 RX ADMIN — CELECOXIB 200 MG: 200 CAPSULE ORAL at 07:56

## 2025-04-08 NOTE — H&P
General Surgery History and Physical      Referring Provider: Karol Carter MD    Chief Complaint:    Pain at prior umbilical hernia repair site    No changes since patient last seen in clinic    History of Present Illness:    Lynda Nolen is a 39 y.o. previously seen in the clinic for pain at the prior umbilical hernia site.  At that time I did not appreciate a hernia recurrence however the patient was insistent on getting repeat imaging.  We did subsequently get a repeat CT scan and the umbilical hernia repair is indeed intact.  The patient reports her pain around the umbilicus has improved.  She does complain of pain at the epigastric hernia site which was primarily repaired.  There is evidence of recurrence of this hernia on imaging.    Past Medical History:   Past Medical History:   Diagnosis Date    Anxiety     Arthritis     Chronic diarrhea     Chronic pain     Depression     Fibromyalgia     GERD (gastroesophageal reflux disease)     History of bipolar disorder     History of goiter 01/06/2016    History of illicit drug use     History of suicide attempt     Hypertension 2013    Injury of back     Injury of neck     Low back pain     Marijuana dependence     DAILY USER    Migraines     MARY GRACE (obstructive sleep apnea)     non compliance with bipap    Shortness of breath     Substance abuse     Vitamin D deficiency       Past Surgical History:    Past Surgical History:   Procedure Laterality Date    ANTERIOR CERVICAL DISCECTOMY W/ FUSION N/A 02/25/2020    Procedure: ANTERIOR CERVICAL DISCECTOMY AND FUSION CERVICAL FIVE THROUGH CERVICAL SIX;  Surgeon: Magno Blackwell MD;  Location: [facility] MAIN OR;  Service: Neurosurgery;  Laterality: N/A;    APPENDECTOMY  2002    CARDIAC CATHETERIZATION      [facility] 12/16    CERVICAL FUSION      CHOLECYSTECTOMY  2012    DILATATION AND CURETTAGE  2001    HERNIA REPAIR  02/2021    incisional hernia repair    OTHER SURGICAL HISTORY      FUSION BACK SURGERY (2014)     TONSILLECTOMY      UMBILICAL HERNIA REPAIR  01/2022     Family History:    Family History   Problem Relation Age of Onset    Cancer Father     Prostate cancer Father     Thyroid disease Sister     Melanoma Sister     Heart disease Brother         MI age 35    Diabetes Paternal Grandmother      Social History:    Social History     Socioeconomic History    Marital status: Single   Tobacco Use    Smoking status: Former     Current packs/day: 0.50     Types: Cigarettes     Passive exposure: Current    Smokeless tobacco: Never   Vaping Use    Vaping status: Every Day    Substances: Nicotine, THC, CBD   Substance and Sexual Activity    Alcohol use: No    Drug use: Yes     Frequency: 10.0 times per week     Types: Marijuana    Sexual activity: Defer     Allergies:   No Known Allergies    Medications:     Current Facility-Administered Medications:     atropine sulfate injection 0.5 mg, 0.5 mg, Intravenous, Once PRN, Anne Hoang, CRNA    ceFAZolin 2000 mg IVPB in 100 mL NS (MBP), 2,000 mg, Intravenous, Once, Karol Carter MD, 2,000 mg at 04/08/25 0847    diphenhydrAMINE (BENADRYL) injection 12.5 mg, 12.5 mg, Intravenous, Once PRN, Anne Hoang, CRNA    diphenhydrAMINE (BENADRYL) injection 12.5 mg, 12.5 mg, Intravenous, Q15 Min PRN, Anne Hoang, CRNA    ePHEDrine Sulfate (Pressors) 5 MG/ML injection 5 mg, 5 mg, Intravenous, Once PRN, Anne Hoang, CRNA    fentaNYL citrate (PF) (SUBLIMAZE) injection 50 mcg, 50 mcg, Intravenous, Q15 Min PRN, Anne Hoang, CRNA    flumazenil (ROMAZICON) injection 0.2 mg, 0.2 mg, Intravenous, PRN, Natalya, Anne, CRNA    hydrALAZINE (APRESOLINE) injection 5 mg, 5 mg, Intravenous, Q5 Min PRN, Anne Hoang, CRNA    HYDROmorphone (DILAUDID) injection 1 mg, 1 mg, Intravenous, Q15 Min PRN, Anne Hoang, CRNA    ipratropium-albuterol (DUO-NEB) nebulizer solution 3 mL, 3 mL, Nebulization, Once PRN, Anne Hoang, CRNA    labetalol (NORMODYNE,TRANDATE) injection 5 mg, 5 mg, Intravenous, Q5 Min PRN,  Anne Hoang CRNA    lactated ringers infusion 1,000 mL, 1,000 mL, Intravenous, Once, Karol [place] MD    lidocaine (cardiac) (XYLOCAINE) injection 100 mg, 100 mg, Intravenous, Q5 Min PRN, Anne Hoang CRNA    lidocaine PF 1% (XYLOCAINE) injection 0.5 mL, 0.5 mL, Intradermal, Once PRN, Karol [place] MD    lidocaine PF 1% (XYLOCAINE) injection 0.5 mL, 0.5 mL, Intradermal, Once PRN, Karol Carter MD    naloxone (NARCAN) injection 0.4 mg, 0.4 mg, Intravenous, PRN, Anne Hoang CRNA    ondansetron (ZOFRAN) injection 4 mg, 4 mg, Intravenous, Once PRN, Anne Hoang CRNA    oxyCODONE (ROXICODONE) immediate release tablet 10 mg, 10 mg, Oral, Q4H PRN, Anne Hoang CRNA    oxyCODONE (ROXICODONE) immediate release tablet 5 mg, 5 mg, Oral, Once PRN, Anne Hoang CRNA    scopolamine patch 1 mg/72 hr, 1 patch, Transdermal, Once, Amandeep Hampton MD, 1 patch at 04/08/25 0756    sodium chloride 0.9 % flush 10 mL, 10 mL, Intravenous, Q12H, Amandeep Hampton MD    sodium chloride 0.9 % flush 10 mL, 10 mL, Intravenous, PRN, Amandeep Hampton MD    sodium chloride 0.9 % flush 10 mL, 10 mL, Intravenous, PRNRaul Kay, MD    sodium chloride 0.9 % flush 10 mL, 10 mL, Intravenous, PRNRaul Kay, MD    sodium chloride 0.9 % flush 3 mL, 3 mL, Intravenous, Q12H, Karol [place] MD    sodium chloride 0.9 % flush 3-10 mL, 3-10 mL, Intravenous, PRNRaul Kay, MD    sodium chloride 0.9 % infusion 40 mL, 40 mL, Intravenous, PRN, Amandeep Hampton MD    sodium chloride 0.9 % infusion 40 mL, 40 mL, Intravenous, PRNRaul Kay, MD    sodium chloride 0.9 % infusion, 100 mL/hr, Intravenous, Continuous, Karol Carter MD    Radiology/Endoscopy:    CT abdomen/pelvis 2/10/2025  Impression:  1.Stable postsurgical changes from umbilical/supraumbilical hernia repair with mesh. No evidence of recurrent hernia.  2.Stable tiny fat-containing right para midline ventral hernia in the epigastric region.  3.2.3 cm right adnexal cyst.    Labs:     None recent within our system    Review of Systems:   As noted above in HPI    Physical Exam:   No acute distress, alert  Nonlabored respirations  Abdomen soft, nontender, nondistended, small reducible epigastric hernia under prior laparoscopic port site  Extremities warm and well-perfused with no gross deformities    Assessment and Plan:  Lynda Nolen is a 39 y.o. with recurrence of epigastric hernia at prior laparoscopic port site.    - We discussed given the size my recommendation would be primary repair  - Procedure along with associate risk, benefits, terms were discussed; risk discussed include but are not limited to bleeding, infection, hernia recurrence  - Patient expressed understanding and would like to proceed with repair of the epigastric hernia    Karol [place] MD  General Surgery

## 2025-04-08 NOTE — ANESTHESIA POSTPROCEDURE EVALUATION
Patient: Lynda Nolen    Procedure Summary       Date: 04/08/25 Room / Location: [facility] OR 07 / [facility] MAIN OR    Anesthesia Start: 0853 Anesthesia Stop: 1003    Procedure: Open incisional hernia repair (Abdomen) Diagnosis:       Incisional hernia, without obstruction or gangrene      (Incisional hernia, without obstruction or gangrene [K43.2])    Surgeons: Karol Carter MD Provider: Amandeep Hampton MD    Anesthesia Type: general ASA Status: 2            Anesthesia Type: general    Vitals  Vitals Value Taken Time   /60 04/08/25 10:49   Temp 98.1 °F (36.7 °C) 04/08/25 09:56   Pulse 87 04/08/25 10:53   Resp 13 04/08/25 10:41   SpO2 95 % 04/08/25 10:53   Vitals shown include unfiled device data.        Post Anesthesia Care and Evaluation    Patient location during evaluation: PACU  Patient participation: complete - patient participated  Level of consciousness: awake  Pain scale: See nurse's notes for pain score.  Pain management: adequate    Airway patency: patent  Anesthetic complications: No anesthetic complications  PONV Status: none  Cardiovascular status: acceptable  Respiratory status: acceptable and spontaneous ventilation  Hydration status: acceptable    Comments: Patient seen and examined postoperatively; vital signs stable; SpO2 greater than or equal to 90%; cardiopulmonary status stable; nausea/vomiting adequately controlled; pain adequately controlled; no apparent anesthesia complications; patient discharged from anesthesia care when discharge criteria were met

## 2025-04-08 NOTE — ANESTHESIA PROCEDURE NOTES
Airway  Reason: elective    Date/Time: 4/8/2025 8:59 AM  Airway not difficult    General Information and Staff    Patient location during procedure: OR  CRNA/CAA: Anne Hoang CRNA    Indications and Patient Condition  Indications for airway management: airway protection    Preoxygenated: yes    Mask difficulty assessment: 0 - not attempted    Final Airway Details    Final airway type: endotracheal airway      Successful airway: ETT  Cuffed: yes   Successful intubation technique: video laryngoscopy  Adjuncts used in placement: intubating stylet  Endotracheal tube insertion site: oral  Blade: Barber  Blade size: 3  ETT size (mm): 7.0  Cormack-Lehane Classification: grade I - full view of glottis  Placement verified by: chest auscultation and capnometry   Measured from: lips  Number of attempts at approach: 1  Assessment: lips, teeth, and gum same as pre-op and atraumatic intubation

## 2025-04-08 NOTE — DISCHARGE INSTRUCTIONS
Call the office to schedule followup appointment approx 2 wks postop  Keep incisions dry for first 24-48 hrs then may remove overlying bandages but leave white steristrips in place  May shower soap and water after bandages are removed but no baths/soaking x 2 weeks  No lifting >10-15 lbs x 6 wks  Over the counter stool softener twice daily until off narcotics and having regular bowel movements  Milk of magnesia as needed if still constipated with stool softeners   May not work or drive while on narcotics

## 2025-04-09 NOTE — OP NOTE
Operative Report    Patient Name:  Lynda Nolen  YOB: 1985    Date of Surgery:  4/8/2025    Pre-op Diagnosis:   Incisional hernia, without obstruction or gangrene [K43.2]       Post-op Diagnosis:   Incisional hernia, without obstruction or gangrene [K43.2]    Procedure:  Open incisional hernia repair - primary closure    Staff:  Surgeon(s):  Karol Carter MD    Circulator: Deborah Nieves RN  Scrub Person: Cecil Salvador  Assistant: Neris Kearney RNFA  was responsible for performing the following activities: Retraction, Suction, and Closing and their skilled assistance was necessary for the success of this case.    Anesthesia: General    Estimated Blood Loss: minimal    Implants:    Nothing was implanted during the procedure    Specimen:          None    Findings: 1 cm incisional hernia at prior laparoscopic port site in the epigastrium containing incarcerated preperitoneal fat.  Residual Ethibond sutures from prior repair.    Complications: None immediate    CLINICAL INDICATIONS:  Patient is a 39 year old female previously seen in the clinic for abdominal pain and possible hernia recurrence.  CT scan of the abdomen/pelvis showed that the hernia repair in the umbilical region was intact but she did have a small epigastric hernia.  Patient reported pain secondary to this hernia so she was interested in repair.  Due to the small size we discussed primary repair.  The procedure along with the associated risks, benefits, and alternatives to surgery were discussed.  The patient verbalized understanding and wished to proceed with surgery.  Informed consent was obtained.    DESCRIPTION OF PROCEDURE:  The patient was brought to the operating room and placed in the supine position with both arms out.  Bilateral sequential compression stockings placed on the lower extremities.  The patient was induced and intubated by the Anesthesia service.  Perioperative antibiotics were administered.  The patient's  abdomen was then prepped and draped in the usual sterile fashion.  A time out was performed.    We made an incision over the prior laparoscopic port incision.  We dissected down to the fascia with electrocautery and encountered herniated preperitoneal fat.  This was excised and we gained entry into the abdominal cavity and could see the liver down below.  There was a 1 cm x 1 cm fascial defect.  In this area there was also residual Ethibond suture and these were cut out.  We closed the fascial defect with 2 figure-of-eight stitches using #1 Prolene suture and we buried the knots.  The fascial closure was checked and appeared well closed.  The area was irrigated and local anesthetic was infiltrated.  The deep dermal layer was  reapproximated using 3-0 vicryl suture in an interrupted fashion.  The skin was then closed with 4-0 vicryl in a running subcuticular fashion.  The incision was cleaned and dressed with sterile dressings.    The patient tolerated the procedure well.  She was awakened and extubated by anesthesia and then transferred to the recovery room in stable condition.  At the completion of the case, all instrument, needle, and sponge counts were correct.     Karol Carter MD     Date: 4/9/2025  Time: 14:45 EDT    This note was created using Dragon Voice Recognition software.

## 2025-04-10 ENCOUNTER — TELEPHONE (OUTPATIENT)
Dept: SURGERY | Facility: CLINIC | Age: 40
End: 2025-04-10
Payer: MEDICAID

## 2025-04-10 NOTE — TELEPHONE ENCOUNTER
Call placed to patient to follow up after surgery. Discussed follow up appointment and encouraged to call with any questions. Patient expressed understanding of all discussed.

## 2025-04-22 ENCOUNTER — OFFICE VISIT (OUTPATIENT)
Dept: SURGERY | Facility: CLINIC | Age: 40
End: 2025-04-22
Payer: MEDICAID

## 2025-04-22 VITALS
TEMPERATURE: 98.2 F | BODY MASS INDEX: 20.77 KG/M2 | OXYGEN SATURATION: 99 % | WEIGHT: 110 LBS | HEART RATE: 73 BPM | DIASTOLIC BLOOD PRESSURE: 82 MMHG | SYSTOLIC BLOOD PRESSURE: 134 MMHG | HEIGHT: 61 IN

## 2025-04-22 DIAGNOSIS — K43.2 INCISIONAL HERNIA, WITHOUT OBSTRUCTION OR GANGRENE: Primary | ICD-10-CM

## 2025-04-22 NOTE — PROGRESS NOTES
"General Surgery Post-Operative Follow Up Note     Subjective:  Lynda Nolen is a 39 y.o. year old female here for post-operative follow up.  She is doing as expected from a surgical standpoint.  Still having some discomfort with movement but overall improving.  Denies any drainage or obvious swelling to the area.  Intraoperative findings were discussed with patient.    Procedure:    Open incisional hernia repair 4/8/2025    Vitals:  /82 (BP Location: Left arm, Patient Position: Sitting, Cuff Size: Adult)   Pulse 73   Temp 98.2 °F (36.8 °C) (Infrared)   Ht 154.9 cm (61\")   Wt 49.9 kg (110 lb)   SpO2 99%   BMI 20.78 kg/m²      Physical Exam:   NAD, alert  Nonlabored respirations  Abdomen soft, nondistended, mildly tender over incisional hernia repair site with small amount of swelling secondary to redundant tissue with the hernia repair, no evidence of recurrence or infection    Assessment and Plan:  Lynda Nolen is a 39 y.o. year old female status post open incisional hernia repair, doing well.    -Continue with lifting restrictions for a total of 6 weeks postop  -Followup on PRN basis    Karol Carter M.D.  General Surgery  "

## (undated) DEVICE — GLV SURG SENSICARE POLYISPRN W/ALOE PF LF 6.5 GRN STRL

## (undated) DEVICE — C-ARM: Brand: UNBRANDED

## (undated) DEVICE — SOL IRRIG NACL 9PCT 1000ML BTL

## (undated) DEVICE — SOLUTION,WATER,IRRIGATION,1000ML,STERILE: Brand: MEDLINE

## (undated) DEVICE — CODMAN® SURGICAL PATTIES 1/2" X 1/2" (1.27CM X 1.27CM): Brand: CODMAN®

## (undated) DEVICE — YANKAUER,BULB TIP,W/O VENT,RIGID,STERILE: Brand: MEDLINE

## (undated) DEVICE — SOL IRR NACL 0.9PCT BO 1000ML

## (undated) DEVICE — GLV SURG SIGNATURE ESSENTIAL PF LTX SZ6.5

## (undated) DEVICE — GLOVE,SURG,SENSICARE SLT,LF,PF,6: Brand: MEDLINE

## (undated) DEVICE — SOL IRRIG H2O 1000ML STRL

## (undated) DEVICE — GLV SURG SENSICARE PI PF LF 7 GRN STRL

## (undated) DEVICE — KT SURG TURNOVER 050

## (undated) DEVICE — PENCL HND ROCKRSWTCH HOLSTR EZ CLEAN TP CRD 10FT

## (undated) DEVICE — 1.7MM PRECISION NEURO (MATCH HEAD)

## (undated) DEVICE — PK BASIC SPINE 50

## (undated) DEVICE — NDL HYPO PRECISIONGLIDE REG 25G 1 1/2

## (undated) DEVICE — BLANKT WARM UPPR/BDY ARM/OUT 57X196CM

## (undated) DEVICE — ELECTRD BLD EZ CLN MOD XLNG 2.75IN

## (undated) DEVICE — GLV SURG SENSICARE PI PF LF 8.5 GRN STRL

## (undated) DEVICE — 3.0MM PRECISION NEURO (MATCH HEAD)

## (undated) DEVICE — DECANTER: Brand: UNBRANDED

## (undated) DEVICE — DRSNG WND BORDR/ADHS NONADHR/GZ LF 4X4IN STRL

## (undated) DEVICE — 3M™ STERI-DRAPE™ INSTRUMENT POUCH 9097: Brand: 3M™ STERI-DRAPE™

## (undated) DEVICE — SUT VIC 3/0 SH CR8 18IN J864D

## (undated) DEVICE — INTENDED FOR TISSUE SEPARATION, AND OTHER PROCEDURES THAT REQUIRE A SHARP SURGICAL BLADE TO PUNCTURE OR CUT.: Brand: BARD-PARKER ® CARBON RIB-BACK BLADES

## (undated) DEVICE — SUT PROLN 0 CT1 30IN 8424H

## (undated) DEVICE — PK PROC TURNOVER

## (undated) DEVICE — MARKR SKIN W/RULR

## (undated) DEVICE — ORG INST STRIP/TS ADHS 2X10IN YEL STRL

## (undated) DEVICE — UNDYED BRAIDED (POLYGLACTIN 910), SYNTHETIC ABSORBABLE SUTURE: Brand: COATED VICRYL

## (undated) DEVICE — ANTIBACTERIAL UNDYED BRAIDED (POLYGLACTIN 910), SYNTHETIC ABSORBABLE SUTURE: Brand: COATED VICRYL

## (undated) DEVICE — GLV SURG SIGNATURE ESSENTIAL PF LTX SZ8

## (undated) DEVICE — SYR LL TP 10ML STRL

## (undated) DEVICE — SPNG GZ WOVN 4X4IN 12PLY 10/BX STRL

## (undated) DEVICE — INS 8530605 TRI FLAT DRILL BIT STERILE

## (undated) DEVICE — CUFF SCD HEMOFORCE SEQ CALF STD MD

## (undated) DEVICE — SUT MONOCRYL 4/0 PS2 27IN Y426H ETY426H

## (undated) DEVICE — 3M™ STERI-STRIP™ REINFORCED ADHESIVE SKIN CLOSURES, R1547, 1/2 IN X 4 IN (12 MM X 100 MM), 6 STRIPS/ENVELOPE: Brand: 3M™ STERI-STRIP™

## (undated) DEVICE — SKIN AFFIX SURG ADHESIVE 72/CS 0.55ML: Brand: MEDLINE

## (undated) DEVICE — THE STERILE CAMERA HANDLE COVER IS FOR USE WITH THE STERIS SURGICAL LIGHTING AND VISUALIZATION SYSTEMS.

## (undated) DEVICE — PK MAJ LAPAROTOMY 50

## (undated) DEVICE — SPONGE: SPECIALTY CHERRY DISS XR 100/CS: Brand: MEDICAL ACTION INDUSTRIES

## (undated) DEVICE — CATH IV INSYTE AUTOGARD 14G 1 1/2IN ORNG

## (undated) DEVICE — THE STERILE LIGHT HANDLE COVER IS USED WITH STERIS SURGICAL LIGHTING AND VISUALIZATION SYSTEMS.